# Patient Record
Sex: MALE | Race: WHITE | Employment: FULL TIME | URBAN - METROPOLITAN AREA
[De-identification: names, ages, dates, MRNs, and addresses within clinical notes are randomized per-mention and may not be internally consistent; named-entity substitution may affect disease eponyms.]

---

## 2017-11-20 ENCOUNTER — OFFICE VISIT (OUTPATIENT)
Dept: FAMILY MEDICINE CLINIC | Facility: CLINIC | Age: 32
End: 2017-11-20

## 2017-11-20 DIAGNOSIS — Z23 NEED FOR TD VACCINE: Primary | ICD-10-CM

## 2017-11-20 PROCEDURE — 90714 TD VACC NO PRESV 7 YRS+ IM: CPT | Performed by: NURSE PRACTITIONER

## 2017-11-20 PROCEDURE — 90471 IMMUNIZATION ADMIN: CPT | Performed by: NURSE PRACTITIONER

## 2017-11-20 NOTE — PROGRESS NOTES
Vaccination Screening Questionnaire filled out by patient/parent and reviewed by myself. No contraindications to vaccination. Vaccination information sheet given to patient/parent. Side effects and risks of vaccination explained and discussed.   Patient/

## 2019-01-15 ENCOUNTER — APPOINTMENT (OUTPATIENT)
Dept: CT IMAGING | Facility: HOSPITAL | Age: 34
End: 2019-01-15
Attending: EMERGENCY MEDICINE

## 2019-01-15 PROCEDURE — 70450 CT HEAD/BRAIN W/O DYE: CPT | Performed by: EMERGENCY MEDICINE

## 2019-01-15 NOTE — ED INITIAL ASSESSMENT (HPI)
Patient here with mom and sister. Mother reports the patient was drinking for the past few days, was violent yesterday, she called the police, patient refused to come to the ER.   Mother stayed at her daughters house last night, when she came home this aft

## 2019-01-15 NOTE — ED PROVIDER NOTES
Patient Seen in: [facility] Emergency Department    History   Patient presents with:  Head Neck Injury (neurologic, musculoskeletal)  Alcohol Intoxication (neurologic)    Stated Complaint: head injury, unkown to patient.     HPI    78-year-old male w Resp 21   Ht 185.4 cm (6' 1\")   Wt 67.6 kg   SpO2 99%   BMI 19.66 kg/m²         Physical Exam    HEENT : NCAT, EOMI, PEERL, throat clear, neck supple, no JVD, trachea midline, No LAD  Heart: S1S2 normal. No murmurs, regular rate and rhythm  Lungs: Clear techniques were used. Dose information is transmitted to the A[facility] [employer] of Radiology) NRDR ([address]) which includes the Dose Index Registry.   PATIENT STATED HISTORY: (As transcribed by Technologist)  Patient presents wit

## 2019-01-15 NOTE — ED INITIAL ASSESSMENT (HPI)
Patient drinking Vodka since Sunday, family states things were broken in the house, disheveled. Patient was home alone last night, family was concerned patient hit his head.  Patient states he drinks about a pint of Vodka a day, has been drinking on and off

## 2019-01-16 NOTE — BH LEVEL OF CARE ASSESSMENT
Level of Care Assessment Note    General Questions  Why are you here?: \"I have a drinking problem. I fell and hit my head and I don't remember how I did it. \"  Precipitating Events: Pt reports he started drinking Sunday night which led up to him falling l you ever done anything, started to do anything, or prepared to do anything to end your life? (lifetime): No  Score -  OV: 0- Low Risk   Describe : Pt states, \"I say stuff when I'm drunk but I don't think I really mean it. \"   Is your experience of thoug Description: Pt reports above depressive symptoms began a few years ago and are exacerbated with alcohol use. Anxiety Symptoms: Generalized; Excessive sweating;Shaking  Panic Attacks: Pt reports he may have had panic attacks when intoxicated in past. No re reports it was helpful.          Current/Previous MH/CD Treatment  Recovery Support Groups: Denies Past History(Pt reports he has been thinking about going to AA.)  History of Seclusion/Restraint: No    Alcohol Use  How often do you have a drink containing Behaviors  Are you/others concerned about any of the following behaviors over the past 30 days?: Denies                                              Functional Impairment  Currently Attending School: No  Employment Status: Employed(pt reports he works at [employer] functioning. Pt reports he will drink and not remember his actions, such as drinking and having verbal argument last night with mother or falling while intoxicated.   Judgment: Poor  Fair/poor judgment as evidenced by: Pt continuing to drink almost daily de Erick.    Motivational Stage of Change  Motivational Stage of Change: Contemplative    Level of Care Recommendations  Consulted with: Dr. Rosy Llanos who recommended inpatient detox.   Level of Care Recommendation: Inpatient Acute Care  Unit: CDU  Reason for Uni

## 2019-01-16 NOTE — ED INITIAL ASSESSMENT (HPI)
Pt here for alcohol intoxication. Pt lives with mom and mom was concerned with his well being because he is having difficulty ambulating with a steady gait. Pt states he is already seeking treatment for his drinking problem and denies SI/HI at this time.

## 2019-01-16 NOTE — ED PROVIDER NOTES
Patient Seen in: [facility] Emergency Department    History   Patient presents with:  Alcohol Intoxication (neurologic)    Stated Complaint: ETOH intoxication     HPI    Constantino Gutierrez is a pleasant 47-year-old gentleman coming back to the emergency departme normal limits   DRUG SCREEN 7 W/OUT CONFIRMATION, URINE - Abnormal; Notable for the following components:    Cannabinoid Urine Presumed Positive (*)     Ethyl Alcohol Urine, Qualitative Positive (*)     All other components within normal limits    [place]

## 2019-01-17 NOTE — ED NOTES
Pt removing blood pressure and asking to go home. Pt attempting to climb out of bed. Security called and at bedside. Pt redirectable.

## 2019-01-17 NOTE — ED NOTES
Report received from [facility] for continuation of care in ED.

## 2019-01-17 NOTE — ED NOTES
Remaining 2.5 mg of Haldol given to Pt per Dr. Dianna Chen. Pt throwing urinal across room attempting to get out of bed.

## 2019-01-17 NOTE — ED NOTES
Pt's Mom here in the ED to come and pick him up. Pt denies SI/HI. Pt was offered RYAN eval regarding ETOH use or if he wants detox, Pt declined. Sealed SSPB [id number] was handed off to Pt. Pt noted ambulatory with steady gait.

## 2019-01-17 NOTE — ED NOTES
[facility] Patient care assumed at this time.

## 2019-01-17 NOTE — ED NOTES
Received report, pt in 4 point restraints thrashing around cart, trying to sit up and pull out of restraints.  Attempted to redirect patient, edmd notified

## 2019-01-17 NOTE — ED PROVIDER NOTES
Patient is a 26-year-old male who had presented to the emergency department for alcohol intoxication was initially evaluated by my partner, please refer to his documentation for additional details.     Patient was signed over to me at change of shift given

## 2019-01-17 NOTE — ED NOTES
Pt continues to thrash around cart, screaming. Pt attempting to pull out of restraints.  Unable to redirect. edmd notified

## 2019-01-17 NOTE — ED NOTES
Pt resting comfortably on cart, appears asleep. Pt no longer thrashing around cart or screaming.  Security called to remove restraints at this time

## 2019-01-17 NOTE — ED NOTES
Pt now awake, asking if he can go home. Pt A/A/O x 3. Pt was able to give his Mom's phone number ([phone]). Contacted Pt's Mom - Art Jacobs at [phone] and she said she's coming to come and pick him up.  Pt will be discharged upon his Mom's arrival. LYNNE aw

## 2020-05-12 NOTE — ED NOTES
Patient is a 37year old male who came into the ED today due to having nausea and vomiting  And dirrrhea and abd pain. Patietnt states he has been drinking all weekend about 1.5 pints per day or more. He has a hx of abusing alcohol.  He has been to detox be Awake/Alert

## 2020-05-12 NOTE — ED NOTES
Pt mom left bs to go home because of the risk of her age and a recent hip replacement stating she lives 10min away and we can call her once pt is seen by [facility]. Pt is calm and cooperative lights dimmed. Agrees his mom should go home at this time.

## 2020-05-12 NOTE — ED NOTES
Patient is resting comfortably. Denies pain denies nvd. Pt has outpatient referrals for etoh. Pt is awaiting ride home from mom.

## 2020-05-12 NOTE — ED PROVIDER NOTES
Patient Seen in: [facility] Emergency Department      History   Patient presents with:  Abdomen/Flank Pain  Nausea/Vomiting/Diarrhea    Stated Complaint: abd pain/nausea, \"drinking\" alot recently, \"real hungover\" yesterday    HPI  Patient is a 3 Constitutional:       General: He is not in acute distress. Appearance: He is well-developed. He is not toxic-appearing or diaphoretic. Comments:  Thin appearance   Eyes:      Conjunctiva/sclera: Conjunctivae normal.   Neck:      Musculoskeletal: PLATELET    Narrative: The following orders were created for panel order CBC WITH DIFFERENTIAL WITH PLATELET.   Procedure                               Abnormality         Status                     ---------                               ----------- visit            Medications Prescribed:  Current Discharge Medication List

## 2020-05-12 NOTE — ED INITIAL ASSESSMENT (HPI)
Pt here stating he drank a lot of vodka over the weekend and was not able to go to work yesterday. Pt typically drinks a pint - a pint and a half every couple days. Pt stating he went to work today and developed LLQ pain today. +NVD.  Last ETOH drink was Mendez

## 2020-05-12 NOTE — ED PROVIDER NOTES
The patient's case was discussed with JEREMIAS Macias. I interviewed and examined the patient. Abdominal pain today was in the left lower quadrant associated with a couple of episodes of vomiting but no diarrhea.   The patient does drink on a consistent

## 2024-03-11 ENCOUNTER — APPOINTMENT (OUTPATIENT)
Dept: GENERAL RADIOLOGY | Facility: HOSPITAL | Age: 39
End: 2024-03-11
Attending: EMERGENCY MEDICINE
Payer: MEDICAID

## 2024-03-11 ENCOUNTER — APPOINTMENT (OUTPATIENT)
Dept: CT IMAGING | Facility: HOSPITAL | Age: 39
End: 2024-03-11
Attending: EMERGENCY MEDICINE
Payer: MEDICAID

## 2024-03-11 ENCOUNTER — HOSPITAL ENCOUNTER (INPATIENT)
Facility: HOSPITAL | Age: 39
LOS: 8 days | Discharge: HOME | End: 2024-03-19
Attending: EMERGENCY MEDICINE | Admitting: HOSPITALIST
Payer: MEDICAID

## 2024-03-11 DIAGNOSIS — D69.6 THROMBOCYTOPENIA (HCC): ICD-10-CM

## 2024-03-11 DIAGNOSIS — E87.20 METABOLIC ACIDOSIS: ICD-10-CM

## 2024-03-11 DIAGNOSIS — S00.33XA CONTUSION OF NOSE, INITIAL ENCOUNTER: ICD-10-CM

## 2024-03-11 DIAGNOSIS — R56.9 ALCOHOL WITHDRAWAL SEIZURE WITHOUT COMPLICATION (HCC): Primary | ICD-10-CM

## 2024-03-11 DIAGNOSIS — S01.01XA SCALP LACERATION, INITIAL ENCOUNTER: ICD-10-CM

## 2024-03-11 DIAGNOSIS — F10.930 ALCOHOL WITHDRAWAL SEIZURE WITHOUT COMPLICATION (HCC): Primary | ICD-10-CM

## 2024-03-11 DIAGNOSIS — S09.90XA CLOSED HEAD INJURY, INITIAL ENCOUNTER: ICD-10-CM

## 2024-03-11 PROBLEM — T14.90XA TRAUMA: Status: ACTIVE | Noted: 2024-03-11

## 2024-03-11 LAB
ALBUMIN SERPL-MCNC: 4.6 G/DL (ref 3.4–5)
ALBUMIN/GLOB SERPL: 1.4 {RATIO} (ref 1–2)
ALP LIVER SERPL-CCNC: 72 U/L
ALT SERPL-CCNC: 41 U/L
AMMONIA PLAS-MCNC: 36 UMOL/L (ref 11–32)
AMPHET UR QL SCN: NEGATIVE
ANION GAP SERPL CALC-SCNC: 27 MMOL/L (ref 0–18)
AST SERPL-CCNC: 107 U/L (ref 15–37)
BASOPHILS # BLD AUTO: 0.12 X10(3) UL (ref 0–0.2)
BASOPHILS NFR BLD AUTO: 1.8 %
BENZODIAZ UR QL SCN: NEGATIVE
BILIRUB SERPL-MCNC: 1.1 MG/DL (ref 0.1–2)
BILIRUB UR QL STRIP.AUTO: NEGATIVE
BUN BLD-MCNC: 5 MG/DL (ref 9–23)
CALCIUM BLD-MCNC: 8.7 MG/DL (ref 8.5–10.1)
CHLORIDE SERPL-SCNC: 100 MMOL/L (ref 98–112)
CLARITY UR REFRACT.AUTO: CLEAR
CO2 SERPL-SCNC: 14 MMOL/L (ref 21–32)
COCAINE UR QL: NEGATIVE
CREAT BLD-MCNC: 1.06 MG/DL
CREAT UR-SCNC: 41.3 MG/DL
EGFRCR SERPLBLD CKD-EPI 2021: 91 ML/MIN/1.73M2 (ref 60–?)
EOSINOPHIL # BLD AUTO: 0 X10(3) UL (ref 0–0.7)
EOSINOPHIL NFR BLD AUTO: 0 %
ERYTHROCYTE [DISTWIDTH] IN BLOOD BY AUTOMATED COUNT: 13 %
ETHANOL SERPL-MCNC: 151 MG/DL (ref ?–3)
GLOBULIN PLAS-MCNC: 3.3 G/DL (ref 2.8–4.4)
GLUCOSE BLD-MCNC: 128 MG/DL (ref 70–99)
GLUCOSE UR STRIP.AUTO-MCNC: NORMAL MG/DL
HCT VFR BLD AUTO: 38.5 %
HGB BLD-MCNC: 13.2 G/DL
HYALINE CASTS #/AREA URNS AUTO: PRESENT /LPF
IMM GRANULOCYTES # BLD AUTO: 0.02 X10(3) UL (ref 0–1)
IMM GRANULOCYTES NFR BLD: 0.3 %
KETONES UR STRIP.AUTO-MCNC: 80 MG/DL
LEUKOCYTE ESTERASE UR QL STRIP.AUTO: NEGATIVE
LYMPHOCYTES # BLD AUTO: 1.31 X10(3) UL (ref 1–4)
LYMPHOCYTES NFR BLD AUTO: 19.6 %
MCH RBC QN AUTO: 33.6 PG (ref 26–34)
MCHC RBC AUTO-ENTMCNC: 34.3 G/DL (ref 31–37)
MCV RBC AUTO: 98 FL
MDMA UR QL SCN: NEGATIVE
MONOCYTES # BLD AUTO: 0.6 X10(3) UL (ref 0.1–1)
MONOCYTES NFR BLD AUTO: 9 %
NEUTROPHILS # BLD AUTO: 4.62 X10 (3) UL (ref 1.5–7.7)
NEUTROPHILS # BLD AUTO: 4.62 X10(3) UL (ref 1.5–7.7)
NEUTROPHILS NFR BLD AUTO: 69.3 %
NITRITE UR QL STRIP.AUTO: NEGATIVE
OPIATES UR QL SCN: NEGATIVE
OSMOLALITY SERPL CALC.SUM OF ELEC: 291 MOSM/KG (ref 275–295)
OXYCODONE UR QL SCN: NEGATIVE
PH UR STRIP.AUTO: 5.5 [PH] (ref 5–8)
PLATELET # BLD AUTO: 96 10(3)UL (ref 150–450)
POTASSIUM SERPL-SCNC: 3.7 MMOL/L (ref 3.5–5.1)
PROT SERPL-MCNC: 7.9 G/DL (ref 6.4–8.2)
PROT UR STRIP.AUTO-MCNC: 100 MG/DL
RBC # BLD AUTO: 3.93 X10(6)UL
SODIUM SERPL-SCNC: 141 MMOL/L (ref 136–145)
SP GR UR STRIP.AUTO: >1.03 (ref 1–1.03)
UROBILINOGEN UR STRIP.AUTO-MCNC: NORMAL MG/DL
WBC # BLD AUTO: 6.7 X10(3) UL (ref 4–11)

## 2024-03-11 PROCEDURE — 70450 CT HEAD/BRAIN W/O DYE: CPT | Performed by: EMERGENCY MEDICINE

## 2024-03-11 PROCEDURE — 99223 1ST HOSP IP/OBS HIGH 75: CPT | Performed by: HOSPITALIST

## 2024-03-11 PROCEDURE — HZ2ZZZZ DETOXIFICATION SERVICES FOR SUBSTANCE ABUSE TREATMENT: ICD-10-PCS | Performed by: HOSPITALIST

## 2024-03-11 PROCEDURE — 71045 X-RAY EXAM CHEST 1 VIEW: CPT | Performed by: EMERGENCY MEDICINE

## 2024-03-11 PROCEDURE — 74177 CT ABD & PELVIS W/CONTRAST: CPT | Performed by: EMERGENCY MEDICINE

## 2024-03-11 PROCEDURE — 72125 CT NECK SPINE W/O DYE: CPT | Performed by: EMERGENCY MEDICINE

## 2024-03-11 PROCEDURE — 99253 IP/OBS CNSLTJ NEW/EST LOW 45: CPT | Performed by: STUDENT IN AN ORGANIZED HEALTH CARE EDUCATION/TRAINING PROGRAM

## 2024-03-11 RX ORDER — SODIUM CHLORIDE 9 MG/ML
INJECTION, SOLUTION INTRAVENOUS CONTINUOUS
Status: DISCONTINUED | OUTPATIENT
Start: 2024-03-11 | End: 2024-03-13

## 2024-03-11 RX ORDER — LORAZEPAM 2 MG/1
2 TABLET ORAL
Status: DISCONTINUED | OUTPATIENT
Start: 2024-03-11 | End: 2024-03-13

## 2024-03-11 RX ORDER — THIAMINE HYDROCHLORIDE 100 MG/ML
100 INJECTION, SOLUTION INTRAMUSCULAR; INTRAVENOUS DAILY
Status: DISCONTINUED | OUTPATIENT
Start: 2024-03-12 | End: 2024-03-13

## 2024-03-11 RX ORDER — LEVETIRACETAM 500 MG/5ML
1500 INJECTION, SOLUTION, CONCENTRATE INTRAVENOUS ONCE
Status: COMPLETED | OUTPATIENT
Start: 2024-03-11 | End: 2024-03-11

## 2024-03-11 RX ORDER — SODIUM CHLORIDE 9 MG/ML
INJECTION, SOLUTION INTRAVENOUS CONTINUOUS
Status: ACTIVE | OUTPATIENT
Start: 2024-03-11 | End: 2024-03-11

## 2024-03-11 RX ORDER — ONDANSETRON 2 MG/ML
4 INJECTION INTRAMUSCULAR; INTRAVENOUS ONCE
Status: COMPLETED | OUTPATIENT
Start: 2024-03-11 | End: 2024-03-11

## 2024-03-11 RX ORDER — ONDANSETRON 2 MG/ML
4 INJECTION INTRAMUSCULAR; INTRAVENOUS EVERY 6 HOURS PRN
Status: DISCONTINUED | OUTPATIENT
Start: 2024-03-11 | End: 2024-03-19

## 2024-03-11 RX ORDER — IBUPROFEN 400 MG/1
400 TABLET ORAL EVERY 4 HOURS PRN
Status: DISCONTINUED | OUTPATIENT
Start: 2024-03-11 | End: 2024-03-17

## 2024-03-11 RX ORDER — IOHEXOL 350 MG/ML
100 INJECTION, SOLUTION INTRAVENOUS
Status: COMPLETED | OUTPATIENT
Start: 2024-03-11 | End: 2024-03-11

## 2024-03-11 RX ORDER — THIAMINE HYDROCHLORIDE 100 MG/ML
100 INJECTION, SOLUTION INTRAMUSCULAR; INTRAVENOUS ONCE
Status: COMPLETED | OUTPATIENT
Start: 2024-03-11 | End: 2024-03-11

## 2024-03-11 RX ORDER — PROCHLORPERAZINE EDISYLATE 5 MG/ML
5 INJECTION INTRAMUSCULAR; INTRAVENOUS EVERY 8 HOURS PRN
Status: DISCONTINUED | OUTPATIENT
Start: 2024-03-11 | End: 2024-03-19

## 2024-03-11 RX ORDER — LORAZEPAM 2 MG/ML
INJECTION INTRAMUSCULAR
Status: DISCONTINUED
Start: 2024-03-11 | End: 2024-03-11 | Stop reason: WASHOUT

## 2024-03-11 RX ORDER — ONDANSETRON 2 MG/ML
INJECTION INTRAMUSCULAR; INTRAVENOUS
Status: COMPLETED
Start: 2024-03-11 | End: 2024-03-11

## 2024-03-11 RX ORDER — LORAZEPAM 1 MG/1
1 TABLET ORAL
Status: DISCONTINUED | OUTPATIENT
Start: 2024-03-11 | End: 2024-03-13

## 2024-03-11 RX ORDER — DOXEPIN HYDROCHLORIDE 50 MG/1
1 CAPSULE ORAL DAILY
Status: DISCONTINUED | OUTPATIENT
Start: 2024-03-11 | End: 2024-03-15

## 2024-03-11 RX ORDER — LORAZEPAM 2 MG/ML
2 INJECTION INTRAMUSCULAR ONCE
Status: COMPLETED | OUTPATIENT
Start: 2024-03-11 | End: 2024-03-11

## 2024-03-11 RX ORDER — LORAZEPAM 2 MG/ML
2 INJECTION INTRAMUSCULAR
Status: DISCONTINUED | OUTPATIENT
Start: 2024-03-11 | End: 2024-03-13

## 2024-03-11 RX ORDER — IBUPROFEN 200 MG
200 TABLET ORAL EVERY 4 HOURS PRN
Status: DISCONTINUED | OUTPATIENT
Start: 2024-03-11 | End: 2024-03-17

## 2024-03-11 RX ORDER — IBUPROFEN 600 MG/1
600 TABLET ORAL EVERY 4 HOURS PRN
Status: DISCONTINUED | OUTPATIENT
Start: 2024-03-11 | End: 2024-03-17

## 2024-03-11 RX ORDER — ACETAMINOPHEN 500 MG
500 TABLET ORAL EVERY 4 HOURS PRN
Status: DISCONTINUED | OUTPATIENT
Start: 2024-03-11 | End: 2024-03-19

## 2024-03-11 RX ORDER — LORAZEPAM 2 MG/ML
1 INJECTION INTRAMUSCULAR
Status: DISCONTINUED | OUTPATIENT
Start: 2024-03-11 | End: 2024-03-13

## 2024-03-11 NOTE — ED INITIAL ASSESSMENT (HPI)
Patient presents for evaluation of fall, altered mental status. EMS reports mom heard two loud falls. EMS found patient in tub with large hematoma to right side of head, laceration to nose. EMS reports he was starting to be able to answer questions and started seizing on arrival to ER. Seizure lasted approximately 15 seconds. He reports only consuming marijuana today.

## 2024-03-11 NOTE — ED QUICK NOTES
..Orders for admission, patient is aware of plan and ready to go upstairs. Any questions, please call ED RN Precious  at extension [phone].     Vaccinated?  Type of COVID test sent:  COVID Suspicion level: Low      Titratable drug(s) infusing:  Rate: n/a    LOC at time of transport:alert and oriented X2 name and place, unsure of date.     Other pertinent information:     CIWA score=4   NIH score= 0

## 2024-03-11 NOTE — PLAN OF CARE
Admission navigator completed.  [facility] protocol.  Bleeding noted to back of head.  Plan for EEG and neuro consulted. Resting comfortably in bed.  Family at bedside.

## 2024-03-11 NOTE — H&P
[facility]     Philip Castellano Patient Status:  Emergency    1985 MRN [id number]   Location [facility] EMERGENCY DEPARTMENT Attending Corrina Hughes MD   Hosp Day # 0 PCP Tish Guzman     Chief Complaint: seizures    Subjective:    History of Present Illness:     Philip Castellano is a 38 year old male with medical history of alcohol abuse who comes to the ER for evaluation after a fall at home.  His mother called paramedics as she heard 2 loud falls.  He was found in the tub by EMS with a large hematoma on the right side of his head and a laceration to his nose.  He was brought to the ER and on arrival to the ER he started having a seizure which lasted approximately 15 seconds.  He states that his last drink was 2 nights ago.  He does report that he does have a drinking problem and binge drinks frequently.  Today he reports he did have some marijuana.  Currently he is awake alert.  He reports he bit his tongue and is noted to have trauma to his nose and blood around his lips.    History/Other:    Past Medical History:  Past Medical History:   Diagnosis Date    Alcoholism (HCC)     Clavicle fracture      Past Surgical History:   No past surgical history on file.   Family History:   Family History   Problem Relation Age of Onset    Hypertension Mother     Hypertension Father      Social History:    reports that he has quit smoking. He uses smokeless tobacco. He reports current alcohol use. He reports that he does not use drugs.     Allergies: No Known Allergies    Medications:    No current facility-administered medications on file prior to encounter.     Current Outpatient Medications on File Prior to Encounter   Medication Sig Dispense Refill    Sertraline HCl (ZOLOFT) 50 MG Oral Tab Take 1 tablet (50 mg total) by mouth daily. 60 tablet 2       Review of Systems:   A comprehensive review of systems was completed.    Pertinent positives and negatives noted in the  HPI.    Objective:   Physical Exam:    /86   Pulse (!) 140   Resp 18   SpO2 98%   General: No acute distress, Alert  Respiratory: No rhonchi, no wheezes  Cardiovascular: S1, S2. Regular rate and rhythm  Abdomen: Soft, Non-tender, non-distended, positive bowel sounds  Neuro: No new focal deficits  Extremities: No edema      Results:    Labs:      Labs Last 24 Hours:    Recent Labs   Lab 03/11/24  1443   RBC 3.93*   HGB 13.2   HCT 38.5*   MCV 98.0   MCH 33.6   MCHC 34.3   RDW 13.0   NEPRELIM 4.62   WBC 6.7   PLT 96.0*       Recent Labs   Lab 03/11/24  1443   *   BUN 5*   CREATSERUM 1.06   EGFRCR 91   CA 8.7   ALB 4.6      K 3.7      CO2 14.0*   ALKPHO 72   *   ALT 41   BILT 1.1   TP 7.9       No results found for: \"PT\", \"INR\"    No results for input(s): \"TROP\", \"TROPHS\", \"CK\" in the last 168 hours.    No results for input(s): \"TROP\", \"PBNP\" in the last 168 hours.    No results for input(s): \"PCT\" in the last 168 hours.    Imaging: Imaging data reviewed in Epic.    Assessment & Plan:      #Seizures  -suspect ETOH withdrawal seizures  -given keppra in ER  -EEG  -CT brain negative     # Alcohol withdrawls  -WA protocol    # Elevated AST and TCP  -due to ETOH abuse  -trend    # Facial / nose laceration and Partial hematoma    Plan of care discussed with patient and ER physician    Sera Harris MD    Supplementary Documentation:     The 21st Century Cures Act makes medical notes like these available to patients in the interest of transparency. Please be advised this is a medical document. Medical documents are intended to carry relevant information, facts as evident, and the clinical opinion of the practitioner. The medical note is intended as peer to peer communication and may appear blunt or direct. It is written in medical language and may contain abbreviations or verbiage that are unfamiliar.

## 2024-03-11 NOTE — CONSULTS
[facility]  Report of Consultation    Philip Castellano Patient Status:  Inpatient    1985 MRN [id number]   Location [facility] 3NE-A Attending Sera Harris MD   Hosp Day # 0 PCP Tish Guzman     Requesting Physician:  Dr. Hughes    Reason for Consultation:  Trauma II    Chief Complaint:  Found unresponsive, head injury, EtOH    History of Present Illness:  Philip Castellano is a 38 year old male with past medical history of heavy alcohol abuse, presenting to the ER by EMS after being found unresponsive at home.  History is obtained mostly through ED physician, as patient has recently received Ativan and sedated.  Reportedly, patient's mother heard 2 loud falls upstairs, and ultimately found her son in the bathroom with a large hematoma to the right side of his head and minimally responsive.  Upon presentation to the ED, patient had a generalized seizure, presumed secondary to alcohol withdrawal.  He was given Ativan and Keppra.  Patient unable to answer further questions at this time, though per hospitalist note, last drink was 2 nights ago.  Patient has a history of binge drinking.  No history of alcohol withdrawal seizures.    In ED, he was noted to have a large hematoma to the right side of his head, laceration to his nose, bit his tongue.  He was called as a level 2 trauma.  EtOH 151.  CBC without leukocytosis, hemoglobin 13.2, platelets 96.  CMP notable for CO2 14, , remainder of liver enzymes within normal limits.  Urinalysis negative for RBC or infection.  Urine drug screen positive for cannabis.  CT brain negative for acute intracranial process.  Scalp laceration and hematoma noted without calvarial or fracture.  CT C-spine negative for acute traumatic injury.  He was noted to have degenerative changes at C5-6.  CT abdomen pelvis without acute intra-abdominal injuries.  Chest x-ray without pneumothorax or rib fractures.  No additional injuries were noted by ED physician or  patient.  He is not on anticoagulation.    Past medical history EtOH abuse    Past abdominal surgical history: Denies    Denies pertinent family history    History:  Past Medical History:   Diagnosis Date    Alcoholism (HCC)     Clavicle fracture      No past surgical history on file.  Family History   Problem Relation Age of Onset    Hypertension Mother     Hypertension Father       reports that he has quit smoking. He uses smokeless tobacco. He reports current alcohol use. He reports that he does not use drugs.    Allergies:  No Known Allergies    Medications:  No medications prior to admission.         Current Facility-Administered Medications:     sodium chloride 0.9% infusion, , Intravenous, Continuous    sertraline (Zoloft) tab 50 mg, 50 mg, Oral, Daily    thiamine 100 mg/mL injection 100 mg, 100 mg, Intravenous, Once    [START ON 3/12/2024] thiamine 100 mg/mL injection 100 mg, 100 mg, Intravenous, Daily    folic acid (Folvite) 1 mg in sodium chloride 0.9% 50 mL IVPB, 1 mg, Intravenous, Daily    multivitamin (Tab-A-Tara/Beta Carotene) tab 1 tablet, 1 tablet, Oral, Daily    sodium chloride 0.9% infusion, , Intravenous, Continuous    acetaminophen (Tylenol Extra Strength) tab 500 mg, 500 mg, Oral, Q4H PRN    ibuprofen (Motrin) tab 200 mg, 200 mg, Oral, Q4H PRN **OR** ibuprofen (Motrin) tab 400 mg, 400 mg, Oral, Q4H PRN **OR** ibuprofen (Motrin) tab 600 mg, 600 mg, Oral, Q4H PRN    LORazepam (Ativan) tab 1 mg, 1 mg, Oral, Q1H PRN **OR** LORazepam (Ativan) 2 mg/mL injection 1 mg, 1 mg, Intravenous, Q1H PRN **OR** LORazepam (Ativan) tab 2 mg, 2 mg, Oral, Q1H PRN **OR** LORazepam (Ativan) 2 mg/mL injection 2 mg, 2 mg, Intravenous, Q1H PRN    ondansetron (Zofran) 4 MG/2ML injection 4 mg, 4 mg, Intravenous, Q6H PRN    prochlorperazine (Compazine) 10 MG/2ML injection 5 mg, 5 mg, Intravenous, Q8H PRN    Review of Systems:  Review of Systems   Unable to perform ROS      Physical Exam:  BP (!) 140/103   Pulse 108   Temp  99.2 °F (37.3 °C) (Temporal)   Resp 22   SpO2 98%   Physical Exam  Constitutional:       General: He is not in acute distress.     Appearance: He is not ill-appearing or toxic-appearing. Diaphoretic: large hematoma right scalp.  HENT:      Head: Normocephalic.      Nose:      Comments: Superficial laceration to bridge of nose     Mouth/Throat:      Comments: Tongue laceration, dried blood around mouth  Eyes:      Conjunctiva/sclera: Conjunctivae normal.      Pupils: Pupils are equal, round, and reactive to light.   Cardiovascular:      Rate and Rhythm: Normal rate and regular rhythm.   Pulmonary:      Effort: Pulmonary effort is normal. No respiratory distress.   Abdominal:      General: There is no distension.      Palpations: Abdomen is soft.      Tenderness: There is no guarding.      Hernia: No hernia is present.   Musculoskeletal:         General: No swelling. Normal range of motion.      Cervical back: Neck supple.   Skin:     General: Skin is warm and dry.      Coloration: Skin is not jaundiced.   Neurological:      General: No focal deficit present.   Psychiatric:      Comments: sedated         Laboratory Data:  Recent Labs   Lab 03/11/24  1443   RBC 3.93*   HGB 13.2   HCT 38.5*   MCV 98.0   MCH 33.6   MCHC 34.3   RDW 13.0   NEPRELIM 4.62   WBC 6.7   PLT 96.0*       Recent Labs   Lab 03/11/24  1443   *   BUN 5*   CREATSERUM 1.06   CA 8.7   ALB 4.6      K 3.7      CO2 14.0*   ALKPHO 72   *   ALT 41   BILT 1.1   TP 7.9         No results for input(s): \"PTP\", \"INR\", \"PTT\" in the last 168 hours.      XR CHEST AP PORTABLE  (CPT=71045)    Result Date: 3/11/2024  PROCEDURE:  XR CHEST AP PORTABLE  (CPT=71045)  TECHNIQUE:  AP chest radiograph was obtained.  COMPARISON:  None.  INDICATIONS:  mom heard thud, heavy ETOH use. Left hematoma, neck lac. Found in tub 238 level 2 trauma called. was coming around for EMS, seizing on arrival.  PATIENT STATED HISTORY: (As transcribed by Technologist)   Pt.   mom heard thud, heavy ETOH use. Left hematoma, neck lac. Found in tub 238 level 2 trauma called. was coming around for EMS, seizing on arrival.   FINDINGS: Cardiac silhouette and pulmonary vasculature are unremarkable. No consolidation, pleural effusion or pneumothorax.  Prominence of the right hilum is noted. IMPRESSION: No consolidation.  Prominence of the right hilum is noted.  A CT chest examination with contrast evaluate this region to exclude a mass is recommended.   LOCATION:  [facility]      Dictated by (CST): Agustin Rapp MD on 3/11/2024 at 5:53 PM     Finalized by (CST): Agustin Rapp MD on 3/11/2024 at 5:54 PM       CT ABDOMEN+PELVIS(CONTRAST ONLY)(CPT=74177)    Result Date: 3/11/2024  CONCLUSION:  1. Nonspecific fat stranding surrounding the ascending colon and cecum.  Findings may be related to nonspecific colitis. 2. Severe diffuse hepatic steatosis.   LOCATION:  [id number]   Dictated by (CST): Sadiq Gordon MD on 3/11/2024 at 5:08 PM     Finalized by (CST): Sadiq Gordon MD on 3/11/2024 at 5:11 PM       CT BRAIN OR HEAD (00164)    Result Date: 3/11/2024  CONCLUSION:   1. Negative for acute intracranial process.  2. Right frontal parietal scalp laceration and hematoma without calvarial fracture.    LOCATION:  [id number]   Dictated by (CST): Deandre Villalta MD on 3/11/2024 at 3:32 PM     Finalized by (CST): Deandre Villalta MD on 3/11/2024 at 3:34 PM       CT SPINE CERVICAL (CPT=72125)    Result Date: 3/11/2024  CONCLUSION:   1. No evidence of acute traumatic injury of the cervical spine.  2. Disc and endplate degenerative changes at C5-6 resulting in mild central canal stenosis and moderate left-sided foraminal stenosis.     LOCATION:  [id number]   Dictated by (CST): Deandre Villalta MD on 3/11/2024 at 3:08 PM     Finalized by (CST): Deandre Villalta MD on 3/11/2024 at 3:12 PM          Lexis Brink PA-C  3/11/2024  6:23 PM      Medical Decision Making         Impression:  Patient Active Problem List   Diagnosis     Alcohol withdrawal seizure without complication (HCC)    Seizures (HCC)    Closed head injury, initial encounter    Scalp laceration, initial encounter    Contusion of nose, initial encounter    Thrombocytopenia (HCC)    Metabolic acidosis       This is a 38-year-old man who presents as a level 2 trauma, status post fall with EtOH, seizure    No acute trauma surgery intervention  Patient presented as altered, workup was negative for any acute traumatic finding  On physical exam, patient remained altered but was found to have right parietal hematoma, laceration over the bridge of his nose, and a bruise to his right back  Abdomen was negative for any acute finding    Hospitalist to admit  Neurology for seizure  Patient has a significant history of alcohol abuse, CIWA protocol per primary  Trauma surgery will see tomorrow for tertiary survey  Rest of care per primary    Thank you for letting me participate in the care of this patient  Freida Rick MD  [facility]  3/11/2024  7:10 PM

## 2024-03-11 NOTE — ED PROVIDER NOTES
Patient Seen in: [facility] Emergency Department      History     Chief Complaint   Patient presents with    Trauma 1 & 2     Stated Complaint: mom heard thud, heavy ETOH use. Left hematoma, neck lac. Found in tub 238 level*    Subjective:   HPI    38-year-old male with a history of alcohol abuse was brought to the emergency department by paramedics after they were called to the residence.  The patient's mother reportedly heard 2 falls and found the patient in the bathroom with a head wound and very stuporous.  His mentation cleared by the time he came to the emergency department but upon arrival he had a generalized seizure.  Because of the patient's level of confusion he is unable to provide any additional history at this time.    Objective:   No pertinent past medical history.            No pertinent past surgical history.              No pertinent social history.            Review of Systems    Positive for stated complaint: mom heard thud, heavy ETOH use. Left hematoma, neck lac. Found in tub 238 level*  Other systems are as noted in HPI.  Constitutional and vital signs reviewed.      All other systems reviewed and negative except as noted above.    Physical Exam     ED Triage Vitals   BP 03/11/24 1451 (!) 118/104   Pulse 03/11/24 1451 (!) 128   Resp 03/11/24 1451 16   Temp 03/11/24 1530 99.2 °F (37.3 °C)   Temp src 03/11/24 1530 Temporal   SpO2 03/11/24 1451 97 %   O2 Device 03/11/24 1451 None (Room air)       Current:BP (!) 146/97   Pulse 109   Temp 99.2 °F (37.3 °C) (Temporal)   Resp 18   SpO2 97%         Physical Exam    General appearance upon arrival: This is a young adult male who is moving about on a gurney and nonverbal.  Vital signs were reviewed per nursing notes.  Monitor revealed a sinus tachycardia rate of 1 10-1 30.  Pulse oximetry was approximately 95%.  Respirations were unlabored.  HEENT: Normocephalic.  Pupils are equal round reactive to light.  There is a subcentimeter laceration  over the nasal bridge with soft tissue swelling.  There is an abrasion over a right parieto-occipital cephalohematoma which is approximately 5 cm in diameter.  Dried blood in the mouth but no large laceration to discerned.  No malocclusion grossly.  Neck: C-collar had been in place however the patient ripped this off upon arrival.  No adenopathy or thyromegaly.  Lungs are clear to auscultation.  Heart exam: Normal S1-S2 without extra sounds or murmurs.  Regular rate and rhythm.  Chest and abdomen are nontender.  Extremities are atraumatic.  Skin is dry without other rashes or lesions.  Neuroexam: Restless, nonverbal and moving about on the cart consistent with post ictal behavior.    ED Course     Labs Reviewed   COMP METABOLIC PANEL (14) - Abnormal; Notable for the following components:       Result Value    Glucose 128 (*)     CO2 14.0 (*)     Anion Gap 27 (*)     BUN 5 (*)      (*)     All other components within normal limits   ETHYL ALCOHOL - Abnormal; Notable for the following components:    Ethyl Alcohol 151 (*)     All other components within normal limits   CBC W/ DIFFERENTIAL - Abnormal; Notable for the following components:    RBC 3.93 (*)     HCT 38.5 (*)     PLT 96.0 (*)     All other components within normal limits   CBC WITH DIFFERENTIAL WITH PLATELET    Narrative:     The following orders were created for panel order CBC With Differential With Platelet.  Procedure                               Abnormality         Status                     ---------                               -----------         ------                     CBC W/ DIFFERENTIAL[[id number]]          Abnormal            Final result                 Please view results for these tests on the individual orders.   URINALYSIS, ROUTINE   DRUG SCREEN 7 W/OUT CONFIRMATION, URINE   RAINBOW DRAW LAVENDER   RAINBOW DRAW LIGHT GREEN   RAINBOW DRAW GOLD   RAINBOW DRAW BLUE     EKG    Rate, intervals and axes as noted on EKG Report.  Rate:  112  Rhythm: Accelerated junctional rhythm  Reading: Nonspecific ST abnormality.  Prolonged QT.  Abnormal EKG.  Agree with EKG report.                 Intravenous access was obtained.  Laboratory studies were drawn.  The patient was treated with 2 mg of Ativan and 4 mg of Zofran IV push.    Keppra 1500 mg IV piggyback was ordered.    CT ABDOMEN+PELVIS(CONTRAST ONLY)(CPT=74177)    Result Date: 3/11/2024  PROCEDURE:  CT ABDOMEN+PELVIS (CONTRAST ONLY) (CPT=74177)  COMPARISON:  None.  INDICATIONS:  mom heard thud, heavy ETOH use. Left hematoma, neck lac. Found in tub 238 level 2 trauma called. was coming around for EMS, seizing on arrival.  TECHNIQUE:  CT scanning was performed from the dome of the diaphragm to the pubic symphysis with non-ionic intravenous contrast material. Post contrast coronal MPR imaging was performed.  Dose reduction techniques were used. Dose information is transmitted to the ACR (American College of Radiology) NRDR (National Radiology Data Registry) which includes the Dose Index Registry.  PATIENT STATED HISTORY:(As transcribed by Technologist)  Patient is here for ETOH, fall, left sided trauma, seizure.   CONTRAST USED:  100cc of Omnipaque 350  FINDINGS:  LIVER:  Diffuse hepatic steatosis.  No focal lesion.  There is a paddle megaly measuring 21.2 centimeters in craniocaudal dimension. BILIARY:  No visible dilatation or calcification.  PANCREAS:  No lesion, fluid collection, ductal dilatation, or atrophy.  SPLEEN:  No enlargement or focal lesion.  KIDNEYS:  No mass, obstruction, or calcification.  ADRENALS:  No mass or enlargement.  AORTA/VASCULAR:  No aneurysm or dissection.  RETROPERITONEUM:  No mass or adenopathy.  BOWEL/MESENTERY:  No dilated bowel.  There is mild fat stranding surrounding the ascending colon and cecum.  The appendix is normal. ABDOMINAL WALL:  No mass or hernia.  URINARY BLADDER:  No visible focal wall thickening, lesion, or calculus.  PELVIC NODES:  No adenopathy.  PELVIC  ORGANS:  No visible mass.  Pelvic organs appropriate for patient age.  BONES:  No bony lesion or fracture.  LUNG BASES:  No visible pulmonary or pleural disease.  OTHER:  Negative.             CONCLUSION:  1. Nonspecific fat stranding surrounding the ascending colon and cecum.  Findings may be related to nonspecific colitis. 2. Severe diffuse hepatic steatosis.   LOCATION:  [id number]   Dictated by (CST): Sadiq Gordon MD on 3/11/2024 at 5:08 PM     Finalized by (CST): Sadiq Gordon MD on 3/11/2024 at 5:11 PM       CT BRAIN OR HEAD (47125)    Result Date: 3/11/2024  PROCEDURE:  CT BRAIN OR HEAD (22369)  COMPARISON:  [facility] , CT, CT BRAIN OR HEAD (86407), 1/15/2019, 6:31 PM.  INDICATIONS:  mom heard thud, heavy ETOH use. Left hematoma, neck lac. Found in tub 238 level 2 trauma called. was coming around for EMS, seizing on arrival.  TECHNIQUE:  Noncontrast CT scanning is performed through the brain. Dose reduction techniques were used. Dose information is transmitted to the ACR (American College of Radiology) NRDR (National Radiology Data Registry) which includes the Dose Index Registry.  PATIENT STATED HISTORY: (As transcribed by Technologist)  Trauma 1 and 2. Patient fell.  Intoxicated. Left head hematoma, Neck laceration.  Found in tub. Seizure on arrival.    FINDINGS:  VENTRICLES/SULCI:  Ventricles and sulci are normal in size.  INTRACRANIAL:  No intracranial hemorrhage, mass effect, or acute large vessel transcortical infarct.  Gray-white differentiation is preserved. SINUSES:           Paranasal sinuses and mastoid air cells are clear. SKULL:             Right frontoparietal scalp laceration and hematoma without calvarial fracture. OTHER:             None.            CONCLUSION:   1. Negative for acute intracranial process.  2. Right frontal parietal scalp laceration and hematoma without calvarial fracture.    LOCATION:  [id number]   Dictated by (CST): Deandre Villalta MD on 3/11/2024 at 3:32 PM     Finalized by (CST):  Deandre Villalta MD on 3/11/2024 at 3:34 PM       CT SPINE CERVICAL (CPT=72125)    Result Date: 3/11/2024  PROCEDURE:  CT SPINE CERVICAL (CPT=72125)  COMPARISON:  None.  INDICATIONS:  mom heard thud, heavy ETOH use. Left hematoma, neck lac. Found in tub 238 level 2 trauma called. was coming around for EMS, seizing on arrival.  TECHNIQUE:  Noncontrast CT scanning of the cervical spine is performed from the skull base through C7.  Multiplanar reconstructions are generated.  Dose reduction techniques were used. Dose information is transmitted to the ACR (American College of Radiology) NRDR (National Radiology Data Registry) which includes the Dose Index Registry.  PATIENT STATED HISTORY: (As transcribed by Technologist)  Trauma 1 and 2. Patient fell.  Intoxicated. Left head hematoma, Neck laceration.  Found in tub. Seizure on arrival.    FINDINGS:    Negative for acute fracture or traumatic malalignment of the cervical spine.  Mild/moderate disc height loss and endplate degenerative change noted at the C5-6 level.  This contributes to mild central canal stenosis and moderate left-sided foraminal stenosis at this level.  Lateral masses are aligned.  Normal odontoid process.  No prevertebral edema.  No epidural or paraspinal collections.  No soft tissue laceration or regional hematoma identified.  Lung apices are clear.            CONCLUSION:   1. No evidence of acute traumatic injury of the cervical spine.  2. Disc and endplate degenerative changes at C5-6 resulting in mild central canal stenosis and moderate left-sided foraminal stenosis.     LOCATION:  [id number]   Dictated by (CST): Deandre Villalta MD on 3/11/2024 at 3:08 PM     Finalized by (CST): Deandre Villalta MD on 3/11/2024 at 3:12 PM       I personally reviewed the images myself and went over results with patient.    I viewed the CT plane brain and CT cervical spine and they show no fracture or intracranial hemorrhage.    I viewed the CT abdomen and pelvis which shows some  nonspecific fat stranding around the ascending colon but no other intra-abdominal or retroperitoneal pathology.    CT imaging was requested by general surgeon Dr. Rick.  Fast scan was performed by this examiner and showed no evidence of pericardial effusion or intra-abdominal fluid collection.    The patient remained lethargic but arousable and had no additional seizures up until the time of dictation.  Case was discussed with [place] hospitalist Dr. Harris who evaluated the patient in the emergency department.    Tetanus status is up-to-date based on EMR.    Plan is to admit the patient to [facility] for alcohol withdrawal seizures based on a regular typically low blood alcohol level for him of 151 upon arrival.    A total of 40 minutes of critical care time (exclusive of billable procedures) was administered to manage the patient's neurologic instability due to his alcohol withdrawal seizures.  This involved direct patient intervention, complex decision making, and/or extensive discussions with the patient, family, and clinical staff.        MDM      #1.  Alcohol withdrawal seizures.  Altered mentation at home may have been the result of the head injury but also an unwitnessed seizure.  Given intravenous Ativan.  Loaded with Keppra.  2.  Closed head injury.  3.  Scalp laceration.  4.  Nasal contusion.  Notable head trauma without intracranial hemorrhage.  Cervical spine shows no fracture on CT.  5.  Thrombocytopenia.  6.  Metabolic acidosis.  Admission disposition: 3/11/2024  4:03 PM                                        Medical Decision Making      Disposition and Plan     Clinical Impression:  1. Alcohol withdrawal seizure without complication (HCC)    2. Closed head injury, initial encounter    3. Scalp laceration, initial encounter    4. Contusion of nose, initial encounter    5. Thrombocytopenia (HCC)    6. Metabolic acidosis         Disposition:  Admit  3/11/2024  4:03 pm    Follow-up:  No follow-up  provider specified.        Medications Prescribed:  Current Discharge Medication List                            Hospital Problems       Present on Admission  Date Reviewed: 6/27/2018            ICD-10-CM Noted POA    * (Principal) Alcohol withdrawal seizure without complication (HCC) F10.930, R56.9 3/11/2024 Unknown    Seizures (HCC) R56.9 3/11/2024 Unknown

## 2024-03-12 ENCOUNTER — NURSE ONLY (OUTPATIENT)
Dept: ELECTROPHYSIOLOGY | Facility: HOSPITAL | Age: 39
End: 2024-03-12
Attending: HOSPITALIST
Payer: MEDICAID

## 2024-03-12 PROBLEM — F10.931 DELIRIUM TREMENS (HCC): Status: ACTIVE | Noted: 2024-03-12

## 2024-03-12 LAB
ALBUMIN SERPL-MCNC: 3.9 G/DL (ref 3.4–5)
ALBUMIN/GLOB SERPL: 1.4 {RATIO} (ref 1–2)
ALP LIVER SERPL-CCNC: 57 U/L
ALT SERPL-CCNC: 30 U/L
ANION GAP SERPL CALC-SCNC: 9 MMOL/L (ref 0–18)
AST SERPL-CCNC: 69 U/L (ref 15–37)
BASOPHILS # BLD AUTO: 0.05 X10(3) UL (ref 0–0.2)
BASOPHILS NFR BLD AUTO: 0.9 %
BILIRUB SERPL-MCNC: 1.8 MG/DL (ref 0.1–2)
BUN BLD-MCNC: 4 MG/DL (ref 9–23)
C DIFF TOX B STL QL: POSITIVE
CALCIUM BLD-MCNC: 7.7 MG/DL (ref 8.5–10.1)
CHLORIDE SERPL-SCNC: 95 MMOL/L (ref 98–112)
CO2 SERPL-SCNC: 30 MMOL/L (ref 21–32)
CREAT BLD-MCNC: 0.64 MG/DL
EGFRCR SERPLBLD CKD-EPI 2021: 123 ML/MIN/1.73M2 (ref 60–?)
EOSINOPHIL # BLD AUTO: 0.01 X10(3) UL (ref 0–0.7)
EOSINOPHIL NFR BLD AUTO: 0.2 %
ERYTHROCYTE [DISTWIDTH] IN BLOOD BY AUTOMATED COUNT: 13 %
GLOBULIN PLAS-MCNC: 2.8 G/DL (ref 2.8–4.4)
GLUCOSE BLD-MCNC: 74 MG/DL (ref 70–99)
GLUCOSE BLD-MCNC: 91 MG/DL (ref 70–99)
HCT VFR BLD AUTO: 32 %
HGB BLD-MCNC: 11 G/DL
IMM GRANULOCYTES # BLD AUTO: 0.04 X10(3) UL (ref 0–1)
IMM GRANULOCYTES NFR BLD: 0.7 %
LYMPHOCYTES # BLD AUTO: 0.62 X10(3) UL (ref 1–4)
LYMPHOCYTES NFR BLD AUTO: 10.5 %
MCH RBC QN AUTO: 32.8 PG (ref 26–34)
MCHC RBC AUTO-ENTMCNC: 34.4 G/DL (ref 31–37)
MCV RBC AUTO: 95.5 FL
MONOCYTES # BLD AUTO: 0.66 X10(3) UL (ref 0.1–1)
MONOCYTES NFR BLD AUTO: 11.2 %
MRSA DNA SPEC QL NAA+PROBE: NEGATIVE
NEUTROPHILS # BLD AUTO: 4.5 X10 (3) UL (ref 1.5–7.7)
NEUTROPHILS # BLD AUTO: 4.5 X10(3) UL (ref 1.5–7.7)
NEUTROPHILS NFR BLD AUTO: 76.5 %
OSMOLALITY SERPL CALC.SUM OF ELEC: 274 MOSM/KG (ref 275–295)
PLATELET # BLD AUTO: 60 10(3)UL (ref 150–450)
POTASSIUM SERPL-SCNC: 2.5 MMOL/L (ref 3.5–5.1)
POTASSIUM SERPL-SCNC: 2.9 MMOL/L (ref 3.5–5.1)
PROT SERPL-MCNC: 6.7 G/DL (ref 6.4–8.2)
RBC # BLD AUTO: 3.35 X10(6)UL
SODIUM SERPL-SCNC: 134 MMOL/L (ref 136–145)
WBC # BLD AUTO: 5.9 X10(3) UL (ref 4–11)

## 2024-03-12 PROCEDURE — 99291 CRITICAL CARE FIRST HOUR: CPT | Performed by: STUDENT IN AN ORGANIZED HEALTH CARE EDUCATION/TRAINING PROGRAM

## 2024-03-12 PROCEDURE — 95816 EEG AWAKE AND DROWSY: CPT | Performed by: OTHER

## 2024-03-12 PROCEDURE — 99291 CRITICAL CARE FIRST HOUR: CPT

## 2024-03-12 PROCEDURE — 99232 SBSQ HOSP IP/OBS MODERATE 35: CPT | Performed by: HOSPITALIST

## 2024-03-12 PROCEDURE — 99232 SBSQ HOSP IP/OBS MODERATE 35: CPT | Performed by: STUDENT IN AN ORGANIZED HEALTH CARE EDUCATION/TRAINING PROGRAM

## 2024-03-12 PROCEDURE — 99255 IP/OBS CONSLTJ NEW/EST HI 80: CPT | Performed by: INTERNAL MEDICINE

## 2024-03-12 RX ORDER — DEXMEDETOMIDINE HYDROCHLORIDE 4 UG/ML
INJECTION, SOLUTION INTRAVENOUS CONTINUOUS
Status: DISCONTINUED | OUTPATIENT
Start: 2024-03-12 | End: 2024-03-16

## 2024-03-12 RX ORDER — CHLORDIAZEPOXIDE HYDROCHLORIDE 25 MG/1
25 CAPSULE, GELATIN COATED ORAL EVERY 8 HOURS
Status: DISCONTINUED | OUTPATIENT
Start: 2024-03-13 | End: 2024-03-12

## 2024-03-12 RX ORDER — LORAZEPAM 1 MG/1
1 TABLET ORAL EVERY 4 HOURS PRN
Status: DISCONTINUED | OUTPATIENT
Start: 2024-03-12 | End: 2024-03-13

## 2024-03-12 RX ORDER — PHENOBARBITAL SODIUM 65 MG/ML
32.5 INJECTION, SOLUTION INTRAMUSCULAR; INTRAVENOUS EVERY 8 HOURS
Status: COMPLETED | OUTPATIENT
Start: 2024-03-16 | End: 2024-03-18

## 2024-03-12 RX ORDER — POTASSIUM CHLORIDE 20 MEQ/1
40 TABLET, EXTENDED RELEASE ORAL EVERY 4 HOURS
Status: COMPLETED | OUTPATIENT
Start: 2024-03-12 | End: 2024-03-12

## 2024-03-12 RX ORDER — CHLORDIAZEPOXIDE HYDROCHLORIDE 25 MG/1
25 CAPSULE, GELATIN COATED ORAL EVERY 6 HOURS
Status: DISCONTINUED | OUTPATIENT
Start: 2024-03-12 | End: 2024-03-12

## 2024-03-12 RX ORDER — THIAMINE HYDROCHLORIDE 100 MG/ML
100 INJECTION, SOLUTION INTRAMUSCULAR; INTRAVENOUS DAILY
Status: DISCONTINUED | OUTPATIENT
Start: 2024-03-12 | End: 2024-03-12

## 2024-03-12 RX ORDER — HALOPERIDOL 5 MG/ML
2 INJECTION INTRAMUSCULAR EVERY 8 HOURS PRN
Status: DISCONTINUED | OUTPATIENT
Start: 2024-03-12 | End: 2024-03-18

## 2024-03-12 RX ORDER — CHLORDIAZEPOXIDE HYDROCHLORIDE 25 MG/1
25 CAPSULE, GELATIN COATED ORAL EVERY 24 HOURS
Status: DISCONTINUED | OUTPATIENT
Start: 2024-03-15 | End: 2024-03-12

## 2024-03-12 RX ORDER — LORAZEPAM 2 MG/ML
INJECTION INTRAMUSCULAR
Status: DISCONTINUED
Start: 2024-03-12 | End: 2024-03-12 | Stop reason: WASHOUT

## 2024-03-12 RX ORDER — DIAZEPAM 5 MG/ML
10 INJECTION, SOLUTION INTRAMUSCULAR; INTRAVENOUS ONCE
Status: COMPLETED | OUTPATIENT
Start: 2024-03-12 | End: 2024-03-12

## 2024-03-12 RX ORDER — DOXEPIN HYDROCHLORIDE 50 MG/1
1 CAPSULE ORAL DAILY
Status: DISCONTINUED | OUTPATIENT
Start: 2024-03-12 | End: 2024-03-12

## 2024-03-12 RX ORDER — DIAZEPAM 5 MG/ML
10 INJECTION, SOLUTION INTRAMUSCULAR; INTRAVENOUS 3 TIMES DAILY
Status: DISCONTINUED | OUTPATIENT
Start: 2024-03-12 | End: 2024-03-12

## 2024-03-12 RX ORDER — DIAZEPAM 5 MG/ML
INJECTION, SOLUTION INTRAMUSCULAR; INTRAVENOUS
Status: COMPLETED
Start: 2024-03-12 | End: 2024-03-12

## 2024-03-12 RX ORDER — PHENOBARBITAL SODIUM 65 MG/ML
65 INJECTION, SOLUTION INTRAMUSCULAR; INTRAVENOUS EVERY 8 HOURS
Status: COMPLETED | OUTPATIENT
Start: 2024-03-14 | End: 2024-03-16

## 2024-03-12 RX ORDER — CHLORDIAZEPOXIDE HYDROCHLORIDE 25 MG/1
25 CAPSULE, GELATIN COATED ORAL EVERY 12 HOURS
Status: DISCONTINUED | OUTPATIENT
Start: 2024-03-14 | End: 2024-03-12

## 2024-03-12 RX ORDER — PHENOBARBITAL SODIUM 65 MG/ML
97.5 INJECTION, SOLUTION INTRAMUSCULAR; INTRAVENOUS EVERY 8 HOURS
Status: COMPLETED | OUTPATIENT
Start: 2024-03-12 | End: 2024-03-14

## 2024-03-12 RX ORDER — POTASSIUM CHLORIDE 20 MEQ/1
40 TABLET, EXTENDED RELEASE ORAL EVERY 4 HOURS
Status: DISCONTINUED | OUTPATIENT
Start: 2024-03-12 | End: 2024-03-12

## 2024-03-12 RX ORDER — LORAZEPAM 2 MG/1
2 TABLET ORAL EVERY 2 HOUR PRN
Status: DISCONTINUED | OUTPATIENT
Start: 2024-03-12 | End: 2024-03-13

## 2024-03-12 RX ORDER — LORAZEPAM 2 MG/ML
5 INJECTION INTRAMUSCULAR
Status: DISCONTINUED | OUTPATIENT
Start: 2024-03-12 | End: 2024-03-13

## 2024-03-12 RX ORDER — LORAZEPAM 2 MG/ML
1 INJECTION INTRAMUSCULAR EVERY 4 HOURS PRN
Status: DISCONTINUED | OUTPATIENT
Start: 2024-03-12 | End: 2024-03-13

## 2024-03-12 RX ORDER — LORAZEPAM 2 MG/ML
3 INJECTION INTRAMUSCULAR
Status: DISCONTINUED | OUTPATIENT
Start: 2024-03-12 | End: 2024-03-13

## 2024-03-12 RX ORDER — LORAZEPAM 2 MG/ML
2 INJECTION INTRAMUSCULAR EVERY 2 HOUR PRN
Status: DISCONTINUED | OUTPATIENT
Start: 2024-03-12 | End: 2024-03-13

## 2024-03-12 RX ORDER — VANCOMYCIN HYDROCHLORIDE 125 MG/1
125 CAPSULE ORAL 4 TIMES DAILY
Status: DISCONTINUED | OUTPATIENT
Start: 2024-03-12 | End: 2024-03-13

## 2024-03-12 RX ORDER — LORAZEPAM 2 MG/ML
6 INJECTION INTRAMUSCULAR EVERY 10 MIN PRN
Status: DISCONTINUED | OUTPATIENT
Start: 2024-03-12 | End: 2024-03-13

## 2024-03-12 RX ORDER — LORAZEPAM 2 MG/ML
4 INJECTION INTRAMUSCULAR EVERY 30 MIN PRN
Status: DISCONTINUED | OUTPATIENT
Start: 2024-03-12 | End: 2024-03-13

## 2024-03-12 NOTE — PLAN OF CARE
Pt is a&ox4, anxiety noted, on RA, denies pain, continuous pulse ox and telemetry in place, IV fluids infusing to PIC per eMAR order.  Therapy on floor- recommends  Up with 2 assist for safety.  Seizure precautions in place, CIWA protocol ordered.   -Pt is anxious and keeps trying to exit bed, staff having multiple conversations with pt to not get up out of the bed without help. Pt needs continuous redirection  1036-[facility] PCT notified this RN pt had unwitnessed fall, pt was found on all fours on the floor next to the bed by PCT. VSS, no evidence of trauma with this RNs head to toe assessment of the patient.  1055-dr. Guero sepulveda paged to notify pt had an unwitnessed fall. Per MD no new orders at this time.  1100 hr- pts sister alfredo called for update on POC. POC dicussed and family verbalizes understanding.   Pt placed in posey vest and four point rails up.   Pt. Is impulsive and anxious, continually trying to exit bed throughout this RNs shift.   Pt is incontinent of bladder and bowel at this time.  Dr. Sepulveda paged for bilateral wrist restraints.   Pt has discontinued 3 PIV today   Safety precautions are in place.    Problem: PAIN - ADULT  Goal: Verbalizes/displays adequate comfort level or patient's stated pain goal  Description: INTERVENTIONS:  - Encourage pt to monitor pain and request assistance  - Assess pain using appropriate pain scale  - Administer analgesics based on type and severity of pain and evaluate response  - Implement non-pharmacological measures as appropriate and evaluate response  - Consider cultural and social influences on pain and pain management  - Manage/alleviate anxiety  - Utilize distraction and/or relaxation techniques  - Monitor for opioid side effects  - Notify MD/LIP if interventions unsuccessful or patient reports new pain  - Anticipate increased pain with activity and pre-medicate as appropriate  Outcome: Progressing     Problem: SAFETY ADULT - FALL  Goal: Free from fall  injury  Description: INTERVENTIONS:  - Assess pt frequently for physical needs  - Identify cognitive and physical deficits and behaviors that affect risk of falls.  - [place] fall precautions as indicated by assessment.  - Educate pt/family on patient safety including physical limitations  - Instruct pt to call for assistance with activity based on assessment  - Modify environment to reduce risk of injury  - Provide assistive devices as appropriate  - Consider OT/PT consult to assist with strengthening/mobility  - Encourage toileting schedule  Outcome: Progressing     Problem: DISCHARGE PLANNING  Goal: Discharge to home or other facility with appropriate resources  Description: INTERVENTIONS:  - Identify barriers to discharge w/pt and caregiver  - Include patient/family/discharge partner in discharge planning  - Arrange for needed discharge resources and transportation as appropriate  - Identify discharge learning needs (meds, wound care, etc)  - Arrange for interpreters to assist at discharge as needed  - Consider post-discharge preferences of patient/family/discharge partner  - Complete POLST form as appropriate  - Assess patient's ability to be responsible for managing their own health  - Refer to Case Management Department for coordinating discharge planning if the patient needs post-hospital services based on physician/LIP order or complex needs related to functional status, cognitive ability or social support system  Outcome: Progressing     Problem: SKIN/TISSUE INTEGRITY - ADULT  Goal: Incision(s), wounds(s) or drain site(s) healing without S/S of infection  Description: INTERVENTIONS:  - Assess and document risk factors for pressure ulcer development  - Assess and document skin integrity  - Assess and document dressing/incision, wound bed, drain sites and surrounding tissue  - Implement wound care per orders  - Initiate isolation precautions as appropriate  - Initiate Pressure Ulcer prevention bundle as  indicated  Outcome: Progressing     Problem: NEUROLOGICAL - ADULT  Goal: Absence of seizures  Description: INTERVENTIONS  - Monitor for seizure activity  - Administer anti-seizure medications as ordered  - Monitor neurological status  Outcome: Progressing  Goal: Achieves maximal functionality and self care  Description: INTERVENTIONS  - Monitor swallowing and airway patency with patient fatigue and changes in neurological status  - Encourage and assist patient to increase activity and self care with guidance from PT/OT  - Encourage visually impaired, hearing impaired and aphasic patients to use assistive/communication devices  Outcome: Progressing

## 2024-03-12 NOTE — PROGRESS NOTES
[facility]  Progress Note    Philip Castellano Patient Status:  Inpatient    1985 MRN [id number]   Location [facility] 3NE-A Attending Guero Sepulveda DO   Hosp Day # 1 PCP Tish Guzman     Subjective:  The patient was seen and examined at bedside.  No acute events overnight.  The patient does not complain of any significant pain today.  He denies abdominal pain.  He denies nausea or vomiting.  He notes that his nose is tender where his laceration is.    The patient is being prepped for an EEG at the time of today's exam.    Hemoglobin slightly decreased to 11 today from 13.2 yesterday.  The patient has been tachycardic with a maximum heart rate of 140.  Elevated blood pressures.    Objective/Physical Exam:  BP (!) 135/103 (BP Location: Right arm)   Pulse 120   Temp 99.6 °F (37.6 °C) (Oral)   Resp 20   Wt 160 lb (72.6 kg)   SpO2 96%   BMI 21.11 kg/m²     Intake/Output Summary (Last 24 hours) at 3/12/2024 1003  Last data filed at 3/12/2024 0500  Gross per 24 hour   Intake 988 ml   Output 600 ml   Net 388 ml         General: Alert, oriented x3. No acute distress.  HEENT: Normocephalic, atraumatic. No scleral icterus.  Nose laceration.  No cervical pain or deformity.  Pulmonary: No respiratory distress, effort normal.   Abdomen: Non-distended, without tympany to percussion. Soft, non-tender to palpation. No rebound or guarding. No peritoneal signs.   Extremities: No lower extremity edema. No clubbing or cyanosis.   Skin: Warm, dry. No jaundice.       Labs:  Lab Results   Component Value Date    WBC 5.9 2024    HGB 11.0 2024    HCT 32.0 2024    PLT 60.0 2024      No results found for: \"PT\", \"INR\"  Lab Results   Component Value Date     2024    K 2.5 2024    CL 95 2024    CO2 30.0 2024    BUN 4 2024    CREATSERUM 0.64 2024    GLU 74 2024    CA 7.7 2024    ALKPHO 57 2024    ALT 30 2024    AST 69 2024     BILT 1.8 03/12/2024    ALB 3.9 03/12/2024    TP 6.7 03/12/2024     Lab Results   Component Value Date    COLORUR Light-Yellow 03/11/2024    CLARITY Clear 03/11/2024    SPECGRAVITY >1.030 03/11/2024    GLUUR Normal 03/11/2024    BILUR Negative 03/11/2024    KETUR 80 03/11/2024    BLOODURINE 2+ 03/11/2024    PHURINE 5.5 03/11/2024    PROUR 100 03/11/2024    UROBILINOGEN Normal 03/11/2024    NITRITE Negative 03/11/2024    LEUUR Negative 03/11/2024       Assessment:  Patient Active Problem List   Diagnosis    Alcohol withdrawal seizure without complication (HCC)    Seizures (HCC)    Closed head injury, initial encounter    Scalp laceration, initial encounter    Contusion of nose, initial encounter    Thrombocytopenia (HCC)    Metabolic acidosis    Trauma     Level 2 trauma  Status post fall with EtOH, seizure     Plan:  Neurology on consult-EEG in process  Medical management per primary  Continue to monitor for signs and symptoms of active bleeding   Antiemetics and analgesics as needed  Ambulate as able  Diet as tolerated      The patient was discussed with Dr. Rick , and she is in agreement with the assessment and plan. My total face time with this patient was 25 minutes. Greater than half of the visit was spent in counseling the patient on the above listed diagnoses and treatment options.     Jesenia Baker PA-C  3/12/2024  10:03 AM    ADDENDUM:     Patient was seen and examined by me. I agree with the above note.  No acute events overnight.  Patient currently on currently undergoing EEG monitoring at the time of exam.  Patient denies any new issues today.    General: Alert, orientated x3. Cooperative. No apparent distress.  Laceration to bridge of nose hemostatic  Pulmonary: non labored breathing, effort normal  Abdomen: Soft, non-distended, nontender to palpation  Back: No C-spine tenderness  Extremities: warm, no edema or cyanosis, compartments soft        A/P 38 year old male status post fall and  seizure     No acute trauma surgery intervention at this time  Tertiary survey performed today  No new issues or injuries found on exam  Patient is more alert today and appropriate  Primary team continuing workup for seizure activity  Trauma surgery will sign off at this time  Please page with any questions or concerns, especially any change in patient's condition  Rest of care per primary    Thank you for let me participate in the care of this patient     This note was initiated by Jesenia Baker PA-C.  The PA saw the patient in conjunction with me. The PA performed a history, exam, and developed the assessment and plan. I agree with the mentioned assessment and plan and have provided further documentation of my own, if necessary.       Freida Rick MD  [facility] General Surgery  3/12/2024  8:50 PM

## 2024-03-12 NOTE — PLAN OF CARE
Assumed care at 1930  Received pt on room air.  Alert to self, place and situation,unsure day of the month  Follows commands  Hematoma on the tongue, per pt he bit it.  CIWA 4-8, tremors noted, denies hallucination, anxious.  Ativan given.  Able to sleep.  C/o sore throat, Motrin given.  IVF, Folic acid, Thiamine given.  ST on tele 's-140s (when moving, using urinal)  Plan:Psych, PT/OT to see, EEG.  Problem: Patient/Family Goals  Goal: Patient/Family Long Term Goal  Description: Patient's Long Term Goal: abstain from alcohol    Interventions:  - Psych to see  -CIWA protocol  -medicines  -comply with POC  - follow up care  - See additional Care Plan goals for specific interventions  Outcome: Progressing  Goal: Patient/Family Short Term Goal  Description: Patient's Short Term Goal: be comfortable    Interventions:   - Ativan PRN  -Pain med  -needs attended  - See additional Care Plan goals for specific interventions  Outcome: Progressing     Problem: PAIN - ADULT  Goal: Verbalizes/displays adequate comfort level or patient's stated pain goal  Description: INTERVENTIONS:  - Encourage pt to monitor pain and request assistance  - Assess pain using appropriate pain scale  - Administer analgesics based on type and severity of pain and evaluate response  - Implement non-pharmacological measures as appropriate and evaluate response  - Consider cultural and social influences on pain and pain management  - Manage/alleviate anxiety  - Utilize distraction and/or relaxation techniques  - Monitor for opioid side effects  - Notify MD/LIP if interventions unsuccessful or patient reports new pain  - Anticipate increased pain with activity and pre-medicate as appropriate  Outcome: Progressing     Problem: SAFETY ADULT - FALL  Goal: Free from fall injury  Description: INTERVENTIONS:  - Assess pt frequently for physical needs  - Identify cognitive and physical deficits and behaviors that affect risk of falls.  - [place] fall  precautions as indicated by assessment.  - Educate pt/family on patient safety including physical limitations  - Instruct pt to call for assistance with activity based on assessment  - Modify environment to reduce risk of injury  - Provide assistive devices as appropriate  - Consider OT/PT consult to assist with strengthening/mobility  - Encourage toileting schedule  Outcome: Progressing     Problem: DISCHARGE PLANNING  Goal: Discharge to home or other facility with appropriate resources  Description: INTERVENTIONS:  - Identify barriers to discharge w/pt and caregiver  - Include patient/family/discharge partner in discharge planning  - Arrange for needed discharge resources and transportation as appropriate  - Identify discharge learning needs (meds, wound care, etc)  - Arrange for interpreters to assist at discharge as needed  - Consider post-discharge preferences of patient/family/discharge partner  - Complete POLST form as appropriate  - Assess patient's ability to be responsible for managing their own health  - Refer to Case Management Department for coordinating discharge planning if the patient needs post-hospital services based on physician/LIP order or complex needs related to functional status, cognitive ability or social support system  Outcome: Progressing     Problem: SKIN/TISSUE INTEGRITY - ADULT  Goal: Incision(s), wounds(s) or drain site(s) healing without S/S of infection  Description: INTERVENTIONS:  - Assess and document risk factors for pressure ulcer development  - Assess and document skin integrity  - Assess and document dressing/incision, wound bed, drain sites and surrounding tissue  - Implement wound care per orders  - Initiate isolation precautions as appropriate  - Initiate Pressure Ulcer prevention bundle as indicated  Outcome: Progressing     Problem: NEUROLOGICAL - ADULT  Goal: Absence of seizures  Description: INTERVENTIONS  - Monitor for seizure activity  - Administer anti-seizure  medications as ordered  - Monitor neurological status  Outcome: Progressing  Goal: Achieves maximal functionality and self care  Description: INTERVENTIONS  - Monitor swallowing and airway patency with patient fatigue and changes in neurological status  - Encourage and assist patient to increase activity and self care with guidance from PT/OT  - Encourage visually impaired, hearing impaired and aphasic patients to use assistive/communication devices  Outcome: Progressing

## 2024-03-12 NOTE — CONSULTS
[facility]  [facility] Neurology Consult Note    Tresa Villar Patient Status:  Inpatient    1985 MRN [id number]   Location [facility] 3NE-A Attending Guero Sepulveda, DO   Hosp Day # 1 PCP Tish Guzman     REASON FOR EVALUATION: Seizure      HISTORY OF PRESENT ILLNESS: TRESA VILLAR is a 38 year old male with history of ETOH abuse with no prior ETOH withdrawals or withdrawal seizures who was found unresponsive at home after his mother heard him fall. When patient was evaluated in the ED, he reportedly had a 15 second seizure which was witnessed by staff. Patient states he does not remember coming to the hospital. He only remembers being at home, then waking up with blood in his mouth and seeing he was in a hospital room. Patient reportedly was post ictal with confusion after this. Patient states he drinks about a \"pint up to a bottle of vodka everyday\" and that his last drink was the night prior to his admission to the hospital ( night). He reportedly had some marijuana prior to admission as well. Patient reports he feels well overall this morning.       PAST MEDICAL HISTORY:  Past Medical History:   Diagnosis Date    Alcoholism (HCC)     Clavicle fracture        PAST SURGICAL HISTORY:  No past surgical history on file.    FAMILY HISTORY:  family history includes Hypertension in his father and mother.    SOCIAL HISTORY:   reports that he has quit smoking. He uses smokeless tobacco. He reports current alcohol use. He reports that he does not use drugs.    ALLERGIES:  No Known Allergies    MEDICATIONS:  No current outpatient medications on file.     Current Facility-Administered Medications   Medication Dose Route Frequency    potassium chloride (K-Dur) tab 40 mEq  40 mEq Oral Q4H    thiamine 100 mg/mL injection 100 mg  100 mg Intravenous Daily    folic acid (Folvite) 1 mg in sodium chloride 0.9% 50 mL IVPB  1 mg Intravenous Daily    multivitamin (Tab-A-Tara/Beta Carotene) tab 1 tablet  1 tablet Oral  Daily    sodium chloride 0.9% infusion   Intravenous Continuous    acetaminophen (Tylenol Extra Strength) tab 500 mg  500 mg Oral Q4H PRN    ibuprofen (Motrin) tab 200 mg  200 mg Oral Q4H PRN    Or    ibuprofen (Motrin) tab 400 mg  400 mg Oral Q4H PRN    Or    ibuprofen (Motrin) tab 600 mg  600 mg Oral Q4H PRN    LORazepam (Ativan) tab 1 mg  1 mg Oral Q1H PRN    Or    LORazepam (Ativan) 2 mg/mL injection 1 mg  1 mg Intravenous Q1H PRN    Or    LORazepam (Ativan) tab 2 mg  2 mg Oral Q1H PRN    Or    LORazepam (Ativan) 2 mg/mL injection 2 mg  2 mg Intravenous Q1H PRN    ondansetron (Zofran) 4 MG/2ML injection 4 mg  4 mg Intravenous Q6H PRN    prochlorperazine (Compazine) 10 MG/2ML injection 5 mg  5 mg Intravenous Q8H PRN       REVIEW OF SYSTEMS:  A 10-point system was reviewed.  Pertinent positives and negatives are noted in HPI.      PHYSICAL EXAMINATION:  VITAL SIGNS: BP (!) 135/103 (BP Location: Right arm)   Pulse 120   Temp 99.6 °F (37.6 °C) (Oral)   Resp 20   Wt 160 lb (72.6 kg)   SpO2 96%   BMI 21.11 kg/m²   GENERAL:  Patient is a 38 year old male in no acute distress.  PSYCH: flat affect  HEENT:  Normocephalic, atraumatic  NECK: Symmetrical, atraumatic  LUNGS: Regular rate, no accessory muscle use  HEART: Regular rate and rhythm.  ABD: Soft, non tender  SKIN: Warm, dry, no rashes  EXTREMITIES: Symmetrical     NEUROLOGICAL:   Mental status: Oriented to person, place, and time   Speech: some slurred speech given tongue swollen and bruised on left side due to biting during seizure  Memory and comprehension: Intact   Cranial Nerves: VFF, PERRL 3mm brisk, EOMI, no nystagmus, facial sensation intact, face symmetric, tongue midline, shoulder shrug equal, remainder CN intact  Motor: No drift, no focal arm or leg weakness- bilateral UE intention tremors noted  Gait: Deferred        DIAGNOSTIC DATA:  Labs:  Recent Labs   Lab 03/11/24  1443 03/12/24  0555   RBC 3.93* 3.35*   HGB 13.2 11.0*   HCT 38.5* 32.0*   MCV  98.0 95.5   MCH 33.6 32.8   MCHC 34.3 34.4   RDW 13.0 13.0   NEPRELIM 4.62 4.50   WBC 6.7 5.9   PLT 96.0* 60.0*       Recent Labs   Lab 03/11/24  1443 03/12/24  0555   * 74   BUN 5* 4*   CREATSERUM 1.06 0.64*   EGFRCR 91 123   CA 8.7 7.7*    134*   K 3.7 2.5*    95*   CO2 14.0* 30.0           IMAGING:  XR CHEST AP PORTABLE  (CPT=71045)    Result Date: 3/11/2024  PROCEDURE:  XR CHEST AP PORTABLE  (CPT=71045)  TECHNIQUE:  AP chest radiograph was obtained.  COMPARISON:  None.  INDICATIONS:  mom heard thud, heavy ETOH use. Left hematoma, neck lac. Found in tub 238 level 2 trauma called. was coming around for EMS, seizing on arrival.  PATIENT STATED HISTORY: (As transcribed by Technologist)  Pt.   mom heard thud, heavy ETOH use. Left hematoma, neck lac. Found in tub 238 level 2 trauma called. was coming around for EMS, seizing on arrival.   FINDINGS: Cardiac silhouette and pulmonary vasculature are unremarkable. No consolidation, pleural effusion or pneumothorax.  Prominence of the right hilum is noted. IMPRESSION: No consolidation.  Prominence of the right hilum is noted.  A CT chest examination with contrast evaluate this region to exclude a mass is recommended.   LOCATION:  [facility]      Dictated by (CST): Agustin Rapp MD on 3/11/2024 at 5:53 PM     Finalized by (CST): Agustin Rapp MD on 3/11/2024 at 5:54 PM       CT ABDOMEN+PELVIS(CONTRAST ONLY)(CPT=74177)    Result Date: 3/11/2024  CONCLUSION:  1. Nonspecific fat stranding surrounding the ascending colon and cecum.  Findings may be related to nonspecific colitis. 2. Severe diffuse hepatic steatosis.   LOCATION:  [facility]   Dictated by ([facility]): Sadiq Gordon MD on 3/11/2024 at 5:08 PM     Finalized by (CST): Sadiq Gordon MD on 3/11/2024 at 5:11 PM       CT BRAIN OR HEAD (41620)    Result Date: 3/11/2024  CONCLUSION:   1. Negative for acute intracranial process.  2. Right frontal parietal scalp laceration and hematoma without calvarial  fracture.    LOCATION:  [id number]   Dictated by (CST): Deandre Villalta MD on 3/11/2024 at 3:32 PM     Finalized by (CST): Deandre Villalta MD on 3/11/2024 at 3:34 PM       CT SPINE CERVICAL (CPT=72125)    Result Date: 3/11/2024  CONCLUSION:   1. No evidence of acute traumatic injury of the cervical spine.  2. Disc and endplate degenerative changes at C5-6 resulting in mild central canal stenosis and moderate left-sided foraminal stenosis.     LOCATION:  [id number]   Dictated by (CST): Deandre Villalta MD on 3/11/2024 at 3:08 PM     Finalized by (CST): Deandre Villalta MD on 3/11/2024 at 3:12 PM           Principal Problem:    Alcohol withdrawal seizure without complication (HCC)  Active Problems:    Seizures (HCC)    Closed head injury, initial encounter    Scalp laceration, initial encounter    Contusion of nose, initial encounter    Thrombocytopenia (HCC)    Metabolic acidosis    Trauma        Assessment/ Plan  Seizure likely 2/2 ETOH withdrawal   -CT brain negative for acute intracranial abnormalities  -EEG negative for epileptiform activity, moderate diffuse slowing consistent with encephalopathy  -No AED indicated at this time as this is a first time seizure and likely provoked  -Continues on CIWA protocol  -Discussed with patient regarding seizure due to alcohol intake, educated that he needs to be monitored currently as he is currently in the ETOH withdrawal window and staff can monitor/ give medication per protocol if patient exhibits withdrawal symptoms, all questions answered at this time      Dr. Samano to follow with further recommendations.  Is this a shared or split note between Advanced Practice Provider and Physician? Yes     GREGG Herrera  [facility]  3/12/2024, 8:35 AM  Spectra [id number]

## 2024-03-12 NOTE — PAYOR COMM NOTE
--------------  ADMISSION REVIEW     Payor: ROLAND  Subscriber #:  [id number]  Authorization Number: [id number]    Admit date: 3/11/24  Admit time:  6:21 PM       REVIEW DOCUMENTATION:     ED Provider Notes        History     Chief Complaint   Patient presents with    Trauma 1 & 2     Stated Complaint: mom heard thud, heavy ETOH use. Left hematoma, neck lac. Found in tub 238 level*    Subjective:   HPI    38-year-old male with a history of alcohol abuse was brought to the emergency department by paramedics after they were called to the residence.  The patient's mother reportedly heard 2 falls and found the patient in the bathroom with a head wound and very stuporous.  His mentation cleared by the time he came to the emergency department but upon arrival he had a generalized seizure.  Because of the patient's level of confusion he is unable to provide any additional history at this time.    Physical Exam     ED Triage Vitals   BP 03/11/24 1451 (!) 118/104   Pulse 03/11/24 1451 (!) 128   Resp 03/11/24 1451 16   Temp 03/11/24 1530 99.2 °F (37.3 °C)   Temp src 03/11/24 1530 Temporal   SpO2 03/11/24 1451 97 %   O2 Device 03/11/24 1451 None (Room air)       Current:BP (!) 146/97   Pulse 109   Temp 99.2 °F (37.3 °C) (Temporal)   Resp 18   SpO2 97%         Physical Exam    General appearance upon arrival: This is a young adult male who is moving about on a gurney and nonverbal.  Vital signs were reviewed per nursing notes.  Monitor revealed a sinus tachycardia rate of 1 10-1 30.  Pulse oximetry was approximately 95%.  Respirations were unlabored.  HEENT: Normocephalic.  Pupils are equal round reactive to light.  There is a subcentimeter laceration over the nasal bridge with soft tissue swelling.  There is an abrasion over a right parieto-occipital cephalohematoma which is approximately 5 cm in diameter.  Dried blood in the mouth but no large laceration to discerned.  No malocclusion grossly.  Neck: C-collar had been in  place however the patient ripped this off upon arrival.  No adenopathy or thyromegaly.  Lungs are clear to auscultation.  Heart exam: Normal S1-S2 without extra sounds or murmurs.  Regular rate and rhythm.  Chest and abdomen are nontender.  Extremities are atraumatic.  Skin is dry without other rashes or lesions.  Neuroexam: Restless, nonverbal and moving about on the cart consistent with post ictal behavior.    ED Course     Labs Reviewed   COMP METABOLIC PANEL (14) - Abnormal; Notable for the following components:       Result Value    Glucose 128 (*)     CO2 14.0 (*)     Anion Gap 27 (*)     BUN 5 (*)      (*)     All other components within normal limits   ETHYL ALCOHOL - Abnormal; Notable for the following components:    Ethyl Alcohol 151 (*)     All other components within normal limits   CBC W/ DIFFERENTIAL - Abnormal; Notable for the following components:    RBC 3.93 (*)     HCT 38.5 (*)     PLT 96.0 (*)     All other components within normal limits     EKG    Rate, intervals and axes as noted on EKG Report.  Rate: 112  Rhythm: Accelerated junctional rhythm  Reading: Nonspecific ST abnormality.  Prolonged QT.  Abnormal EKG.  Agree with EKG report.     Intravenous access was obtained.  Laboratory studies were drawn.  The patient was treated with 2 mg of Ativan and 4 mg of Zofran IV push.    Keppra 1500 mg IV piggyback was ordered.    CT ABDOMEN+PELVIS(CONTRAST ONLY)(CPT=74177)    Result Date: 3/11/2024  PROCEDURE:  CT ABDOMEN+PELVIS (CONTRAST ONLY) (CPT=74177)  CONCLUSION:  1. Nonspecific fat stranding surrounding the ascending colon and cecum.  Findings may be related to nonspecific colitis. 2. Severe diffuse hepatic steatosis.   LOCATION:  [id number]   Dictated by (CST): Sadiq Gordon MD on 3/11/2024 at 5:08 PM     Finalized by (CST): Sadiq Gordon MD on 3/11/2024 at 5:11 PM       CT BRAIN OR HEAD (65421)    Result Date: 3/11/2024  PROCEDURE:  CT BRAIN OR HEAD (56374)  C  CONCLUSION:   1. Negative for  acute intracranial process.  2. Right frontal parietal scalp laceration and hematoma without calvarial fracture.    LOCATION:  [id number]   Dictated by (CST): Deandre Villalta MD on 3/11/2024 at 3:32 PM     Finalized by (CST): Deandre Villalta MD on 3/11/2024 at 3:34 PM       CT SPINE CERVICAL (CPT=72125)    Result Date: 3/11/2024  PROCEDURE:  CT SPINE CERVICAL (CPT=72125)  C  CONCLUSION:   1. No evidence of acute traumatic injury of the cervical spine.  2. Disc and endplate degenerative changes at C5-6 resulting in mild central canal stenosis and moderate left-sided foraminal stenosis.     LOCATION:  [id number]   Dictated by (CST): Deandre Villalta MD on 3/11/2024 at 3:08 PM     Finalized by (CST): Deandre Villalta MD on 3/11/2024 at 3:12 PM           MDM      #1.  Alcohol withdrawal seizures.  Altered mentation at home may have been the result of the head injury but also an unwitnessed seizure.  Given intravenous Ativan.  Loaded with Keppra.  2.  Closed head injury.  3.  Scalp laceration.  4.  Nasal contusion.  Notable head trauma without intracranial hemorrhage.  Cervical spine shows no fracture on CT.  5.  Thrombocytopenia.  6.  Metabolic acidosis.  Admission disposition: 3/11/2024  4:03 PM      Disposition and Plan     Clinical Impression:  1. Alcohol withdrawal seizure without complication (HCC)    2. Closed head injury, initial encounter    3. Scalp laceration, initial encounter    4. Contusion of nose, initial encounter    5. Thrombocytopenia (HCC)    6. Metabolic acidosis         Disposition:  Admit  3/11/2024  4:03 pm    Follow-        Signed by Corrina Hughes MD on 3/11/2024  5:25 PM           HISTORY AND PHYSICAL      Assessment & Plan:   #Seizures  -suspect ETOH withdrawal seizures  -given keppra in ER  -EEG  -CT brain negative      # Alcohol withdrawls  -WA protocol     # Elevated AST and TCP  -due to ETOH abuse  -trend     # Facial / nose laceration and Partial hematoma     Plan of care discussed with patient and ER  physician      3-12-24 NEUROLOGY CONSULT    REASON FOR EVALUATION: Seizure        HISTORY OF PRESENT ILLNESS: TRESA VILLAR is a 38 year old male with history of ETOH abuse with no prior ETOH withdrawals or withdrawal seizures who was found unresponsive at home after his mother heard him fall. When patient was evaluated in the ED, he reportedly had a 15 second seizure which was witnessed by staff. Patient states he does not remember coming to the hospital. He only remembers being at home, then waking up with blood in his mouth and seeing he was in a hospital room. Patient reportedly was post ictal with confusion after this. Patient states he drinks about a \"pint up to a bottle of vodka everyday\" and that his last drink was the night prior to his admission to the hospital (Aris night). He reportedly had some marijuana prior to admission as well. Patient reports he feels well overall this morning.       [facility]  [facility] Neurology Consult Note           Tresa Villar Patient Status:  Inpatient    1985 MRN [id number]   Location [facility] 3NE-A Attending Guero Sepulveda DO   Hosp Day # 1 PCP Tish Guzman      REASON FOR EVALUATION: Seizure        HISTORY OF PRESENT ILLNESS: TRESA VILLAR is a 38 year old male with history of ETOH abuse with no prior ETOH withdrawals or withdrawal seizures who was found unresponsive at home after his mother heard him fall. When patient was evaluated in the ED, he reportedly had a 15 second seizure which was witnessed by staff. Patient states he does not remember coming to the hospital. He only remembers being at home, then waking up with blood in his mouth and seeing he was in a hospital room. Patient reportedly was post ictal with confusion after this. Patient states he drinks about a \"pint up to a bottle of vodka everyday\" and that his last drink was the night prior to his admission to the hospital (Aris night). He reportedly had some marijuana prior to admission as  well. Patient reports he feels well overall this morning.          PHYSICAL EXAMINATION:  VITAL SIGNS: BP (!) 135/103 (BP Location: Right arm)   Pulse 120   Temp 99.6 °F (37.6 °C) (Oral)   Resp 20   Wt 160 lb (72.6 kg)   SpO2 96%   BMI 21.11 kg/m²   GENERAL:  Patient is a 38 year old male in no acute distress.  PSYCH: flat affect  HEENT:  Normocephalic, atraumatic  NECK: Symmetrical, atraumatic  LUNGS: Regular rate, no accessory muscle use  HEART: Regular rate and rhythm.  ABD: Soft, non tender  SKIN: Warm, dry, no rashes  EXTREMITIES: Symmetrical      NEUROLOGICAL:   Mental status: Oriented to person, place, and time   Speech: some slurred speech given tongue swollen and bruised on left side due to biting during seizure  Memory and comprehension: Intact   Cranial Nerves: VFF, PERRL 3mm brisk, EOMI, no nystagmus, facial sensation intact, face symmetric, tongue midline, shoulder shrug equal, remainder CN intact  Motor: No drift, no focal arm or leg weakness- bilateral UE intention tremors noted  Gait: Deferred      DIAGNOSTIC DATA:  Labs:       Recent Labs   Lab 03/11/24  1443 03/12/24  0555   RBC 3.93* 3.35*   HGB 13.2 11.0*   HCT 38.5* 32.0*   MCV 98.0 95.5   MCH 33.6 32.8   MCHC 34.3 34.4   RDW 13.0 13.0   NEPRELIM 4.62 4.50   WBC 6.7 5.9   PLT 96.0* 60.0*              Recent Labs   Lab 03/11/24  1443 03/12/24  0555   * 74   BUN 5* 4*   CREATSERUM 1.06 0.64*   EGFRCR 91 123   CA 8.7 7.7*    134*   K 3.7 2.5*    95*   CO2 14.0* 30.0        Assessment/ Plan  Seizure likely 2/2 ETOH withdrawal   -CT brain negative for acute intracranial abnormalities  -EEG negative for epileptiform activity, moderate diffuse slowing consistent with encephalopathy  -No AED indicated at this time as this is a first time seizure and likely provoked  -Continues on CIWA protocol  -Discussed with patient regarding seizure due to alcohol intake, educated that he needs to be monitored currently as he is currently in  the ETOH withdrawal window and staff can monitor/ give medication per protocol if patient exhibits withdrawal symptoms, all questions answered at this time            MEDICATIONS ADMINISTERED IN LAST 1 DAY:  folic acid (Folvite) 1 mg in sodium chloride 0.9% 50 mL IVPB       Date Action Dose Route User    3/12/2024 0854 New Bag 1 mg Intravenous Annika Mcmanus RN    3/11/2024 2051 New Bag 1 mg Intravenous Katlyn Rowley RN          ibuprofen (Motrin) tab 200 mg       Date Action Dose Route User    3/11/2024 2358 Given 200 mg Oral Katlyn Rowley RN          iohexol (Omnipaque) 350 MG/ML injection via power injector 100 mL       Date Action Dose Route User    3/11/2024 1701 Given 100 mL Intravenous HaJose jayazo          LORazepam (Ativan) tab 1 mg       Date Action Dose Route User    3/12/2024 1041 Given 1 mg Oral Annika Mcmanus RN    3/12/2024 0320 Given 1 mg Oral Katlyn Rowley RN    3/11/2024 2358 Given 1 mg Oral Katlyn Rowley RN    3/11/2024 2208 Given 1 mg Oral Katlyn Rowley RN    3/11/2024 1952 Given by Other 1 mg Oral Katlyn Rowley RN    3/11/2024 1845 Given 1 mg Oral Cecy Mckeon RN lic pend          LORazepam (Ativan) 2 mg/mL injection 1 mg       Date Action Dose Route User    3/12/2024 1520 Given 1 mg Intravenous Bre Flowers RN    3/12/2024 1400 Given 1 mg Intravenous Annika Mcmanus RN          potassium chloride (K-Dur) tab 40 mEq       Date Action Dose Route User    3/12/2024 1358 Given 40 mEq Oral Annika Mcmanus RN    3/12/2024 0853 Given 40 mEq Oral Annika Mcmanus RN          sodium chloride 0.9% infusion 100 ML HR        Date Action Dose Route User    3/12/2024 1526 New Bag (none) Intravenous Bre Flowers RN    3/12/2024 0319 New Bag (none) Intravenous Katlyn Rowley RN    3/11/2024 1947 New Bag (none) Intravenous Katlyn Rowley RN    3/11/2024 1845 New Bag (none) Intravenous Rafaela Dillon, RN          sodium chloride 0.9 % IV bolus 1,000 mL        Date Action Dose Route User    3/11/2024 1552 New Bag 1,000 mL Intravenous Emmie Santillan RN          multivitamin (Tab-A-Tara/Beta Carotene) tab 1 tablet       Date Action Dose Route User    3/12/2024 0854 Given 1 tablet Oral Annika Mcmanus RN    3/11/2024 1837 Given 1 tablet Oral Rafaela Dillon RN          thiamine 100 mg/mL injection 100 mg       Date Action Dose Route User    3/11/2024 1837 Given 100 mg Intravenous Rafaela Dillon RN          thiamine 100 mg/mL injection 100 mg       Date Action Dose Route User    3/12/2024 0854 Given 100 mg Intravenous Annika Mcmanus RN            Vitals (last day)       Date/Time Temp Pulse Resp BP SpO2 Weight O2 Device O2 Flow Rate (L/min) [facility]    03/12/24 1516 -- 119 -- 115/89 -- -- -- --     03/12/24 1400 -- -- -- 140/88 -- -- -- --     03/12/24 1200 98.3 °F (36.8 °C) 115 20 135/94 96 % -- None (Room air) 0 L/min     03/12/24 1040 -- 115 -- -- 96 % -- -- --     03/12/24 1039 -- 116 -- -- 96 % -- -- --     03/12/24 1038 -- 109 -- -- 96 % -- -- --     03/12/24 1037 97.8 °F (36.6 °C) 108 -- 144/98 96 % -- -- --     03/12/24 1028 -- 133 -- -- -- -- -- --     03/12/24 1027 -- 109 -- -- -- -- -- --     03/12/24 1005 -- 155 -- -- -- -- -- -- NC    03/12/24 1000 -- 114 -- 144/98 -- -- -- --     03/12/24 0800 99.6 °F (37.6 °C) 120 20 135/103 96 % -- None (Room air) 0 L/min     03/11/24 1630 -- 109 18 146/97 97 % -- None (Room air) -- ER    03/11/24 1615 -- 130 15 131/98 100 % -- -- -- ER    03/11/24 1545 -- 140 18 102/86 98 % -- -- -- ER    03/11/24 1530 99.2 °F (37.3 °C) -- -- -- -- -- -- -- ER    03/11/24 15:25:54 -- -- -- -- -- -- None (Room air) -- ER    03/11/24 1515 -- 115 18 131/90 97 % -- None (Room air) -- ER    03/11/24 1455 -- 120 18 140/91 97 % -- None (Room air) -- ER    03/11/24 1451 -- 128 16 118/104 97 % -- None (Room air) -- ER          CIWA Scores (since admission)       Date/Time CIWA-Ar Total [facility]    03/12/24 1516 [facility]    03/12/24  1400 7 CS    03/12/24 1200 4 CS    03/12/24 1141 4 CS    03/12/24 1000 7 CS    03/12/24 0800 5 CS    03/12/24 0600 3 AB    03/12/24 0420 4 AB    03/12/24 0320 7 AB    03/12/24 0200 4 AB    03/12/24 0100 6 AB    03/12/24 0000 8 AB    03/11/24 2300 6 AB    03/11/24 2200 8 AB    03/11/24 2052 4 AB    03/11/24 1952 7 AB    03/11/24 1830 8 AA    03/11/24 1754 6 ER    03/11/24 1545 4 ER

## 2024-03-12 NOTE — PROGRESS NOTES
Significant Event - Fall Note    Date/Time of Fall: March 12, 2024 at 1033    Fall huddle completed: Yes    Description of patient fall:     Patient fell from: Bed     Activity when fall occurred: Toileting-related activities     Where did fall occur: Patient room     Was the fall assisted: Found on floor/unassisted to floor    Who witnessed the fall: Unwitnessed    Patient narrative of fall: Pt. Found on floor stated he was trying to go to the bathroom to change is boxers.    Staff narrative of fall: Found on floor by PCT on all fours. Urine on floor. Bed alarm sounding     Name of Provider notified of fall: Dr. Guero Sepulveda    Family notification: Family notified    Factors contributing to fall:     Physical: Deconditioned, Unsteady gait, and Weakness/fatigue     Psychological: Agitation and Impulsive      Environmental: N/A     Medications received in the past 8 hours:   Medication(s) Administered in past 8 Hours from 03/12/2024 0432 to 03/12/2024 1232       Date/Time Order Dose Route Action Action by Comments    03/12/2024 0854 CDT folic acid (Folvite) 1 mg in sodium chloride 0.9% 50 mL IVPB 1 mg Intravenous New Bag Annika Mcmanus RN --    03/12/2024 1041 CDT LORazepam (Ativan) tab 1 mg 1 mg Oral Given Annika Mcmanus RN --    03/12/2024 0854 CDT multivitamin (Tab-A-Tara/Beta Carotene) tab 1 tablet 1 tablet Oral Given Annika Mcmanus RN --    03/12/2024 0853 CDT potassium chloride (K-Dur) tab 40 mEq 40 mEq Oral Given Annika Mcmanus RN --    03/12/2024 0854 CDT thiamine 100 mg/mL injection 100 mg 100 mg Intravenous Given Annika Mcmanus RN --            Was patient identified as high fall risk prior to fall:                                What interventions were in place prior to fall: Bed alarm, Bed in lowest position, Call light within reach, [place] footwear, Patient/family involved in fall prevention plan, Personal items within reach, Initiated PT/OT therapy, Reality orientation, Rounding, and  Toileting regimen    Interventions post fall: Bed alarm, Bed in lowest position, Call light within reach, Chair alarm, Floor mats ([facility] only), [place] footwear, Patient education tool, Patient/family involved in fall prevention plan, Patient situated close to nursing station, Personal items within reach, Initiated PT/OT therapy, Reality orientation, Rounding, Signage in place, and Toileting regimen    Additional comments:

## 2024-03-12 NOTE — PROGRESS NOTES
Psych Liaison routed consult to  to see 03/13 for substance use concerns. Psych Liaison placed referrals for psychotherapy and psychiatry in discharge summary.

## 2024-03-12 NOTE — PROGRESS NOTES
[facility]   part of [facility]     Hospitalist Progress Note     Philip Castellano Patient Status:  Inpatient    1985 MRN [id number]   Location [facility] 3NE-A Attending Guero Sepulveda,    Hosp Day # 1 PCP Tish Guzman     Chief Complaint: seizure activity    Subjective:     Patient denies any sob or cp. Working w/ PT.     Objective:    Review of Systems:   A comprehensive review of systems was completed; pertinent positive and negatives stated in subjective.    Vital signs:  Temp:  [97.4 °F (36.3 °C)-100 °F (37.8 °C)] 99.6 °F (37.6 °C)  Pulse:  [] 109  Resp:  [15-22] 20  BP: (102-160)/() 135/103  SpO2:  [96 %-100 %] 96 %    Physical Exam:    General: No acute distress  Respiratory: No wheezes, no rhonchi  Cardiovascular: S1, S2, regular rate and rhythm  Abdomen: Soft, Non-tender, non-distended, positive bowel sounds  Neuro: No new focal deficits.   Extremities: No edema      Diagnostic Data:    Labs:  Recent Labs   Lab 24  1443 24  0555   WBC 6.7 5.9   HGB 13.2 11.0*   MCV 98.0 95.5   PLT 96.0* 60.0*       Recent Labs   Lab 24  1443 24  0555   * 74   BUN 5* 4*   CREATSERUM 1.06 0.64*   CA 8.7 7.7*   ALB 4.6 3.9    134*   K 3.7 2.5*    95*   CO2 14.0* 30.0   ALKPHO 72 57   * 69*   ALT 41 30   BILT 1.1 1.8   TP 7.9 6.7       CrCl cannot be calculated (Unknown ideal weight.).    No results for input(s): \"TROP\", \"TROPHS\", \"CK\" in the last 168 hours.    No results for input(s): \"PTP\", \"INR\" in the last 168 hours.               Microbiology    No results found for this visit on 24.      Imaging: Reviewed in Epic.    Medications:    potassium chloride  40 mEq Oral Q4H    thiamine  100 mg Intravenous Daily    folic acid  1 mg Intravenous Daily    multivitamin  1 tablet Oral Daily       Assessment & Plan:      #Seizures  -suspect ETOH withdrawal seizures  -given keppra in ER  -EEG  -neuro to see  -CT brain negative      #Alcohol  withdrawals  #Delirium tremons  -CIWA protocol w/ prn benzos  -MVI, thiamine, folic acid     #Hyponatremia  -trend labs, cont ivf    #Elevated AST and TCP  -due to ETOH abuse  -trend     #Facial / nose laceration and Partial hematoma         Guero Sepulveda,     Supplementary Documentation:     Quality:  DVT Mechanical Prophylaxis:   SCDs,    DVT Pharmacologic Prophylaxis   Medication   None                Code Status: Not on file  Stanley: No urinary catheter in place  Stanley Duration (in days):   Central line:    JAZIEL:     Discharge is dependent on: clinical improvement  At this point Mr. Castellano is expected to be discharge to: tbd    The 21st Century Cures Act makes medical notes like these available to patients in the interest of transparency. Please be advised this is a medical document. Medical documents are intended to carry relevant information, facts as evident, and the clinical opinion of the practitioner. The medical note is intended as peer to peer communication and may appear blunt or direct. It is written in medical language and may contain abbreviations or verbiage that are unfamiliar.

## 2024-03-12 NOTE — DISCHARGE INSTRUCTIONS
Psychiatrist In-Network with Medicaid (Accepting New Patients)    Counseling In-Network with Medicaid     [facility], [facility]  [address] [place] [address]  [phone]    [facility], [facility]  [address] [place] [address]  [phone]    [facility]  [address], [place] [address] [phone]    [facility]  [address]  [place], [place] [address] [phone]    [facility]  [address], [place], [place] [id number] [phone]    [facility]  ** Therapy and Psychiatry, multiple locations  [phone]          Cary Sepulveda MD  [facility]   [address], [place], [place] [address] [phone]    Loan Dewitt MD  [facility]  [address]  [place], [place] [address] [phone]    Baltazar Glass MD  [facility]  [address], [place] [address]  [phone]    MD Zac Shaw III, MD  [facility]   [address], [place], [place] [address] [phone]    Hiram Mazariegos MD  [facility]  [address], [place], [place] [address] [phone]    Kena Conrad MD  [facility]  [address] [place], [place] [id number] [phone]    Patient will be transported to the [facility] residential   Program on date of discharge.

## 2024-03-12 NOTE — PHYSICAL THERAPY NOTE
PHYSICAL THERAPY EVALUATION - INPATIENT     Room Number: 3623/3623-A  Evaluation Date: 3/12/2024  Type of Evaluation: Initial  Physician Order: PT Eval and Treat    Presenting Problem: Fall, AMS, seizures, ETOH withdrawal, R scalp hematoma  Co-Morbidities : ETOH abuse  Reason for Therapy: Mobility Dysfunction and Discharge Planning    PHYSICAL THERAPY ASSESSMENT   Patient is currently functioning below baseline with bed mobility, transfers, gait, stair negotiation, maintaining seated position, standing prolonged periods, and performing household tasks.  Prior to admission, patient's baseline is independent.  Patient is requiring contact guard assist and minimal assist as a result of the following impairments: decreased endurance/aerobic capacity, impaired sitting and standing balance, impaired coordination, impaired motor planning, cognitive deficits (decreased safety awareness), medical status, and alcohol withdrawal .  Physical Therapy will continue to follow for duration of hospitalization.    Patient will benefit from continued skilled PT Services For duration of hospitalization, however, given the patient is functioning near baseline level do not anticipate skilled therapy needs at discharge . Anticipate as medical picture and detox symptoms improve, pt functionally will improve as well.     PLAN  PT Treatment Plan: Bed mobility;Body mechanics;Energy conservation;Endurance;Patient education;Family education;Gait training;Strengthening;Balance training;Transfer training  Rehab Potential : Fair  Frequency (Obs): 3x/week  Number of Visits to Meet Established Goals: 5      CURRENT GOALS    Goal #1 Patient is able to demonstrate supine - sit EOB @ level: independent     Goal #2 Patient is able to demonstrate transfers EOB to/from Chair/Wheelchair at assistance level: independent     Goal #3 Patient is able to ambulate 150 feet with assist device:  LRAD  at assistance level: supervision     Goal #4    Goal #5     Goal #6    Goal Comments: Goals established on 3/12/2024      PHYSICAL THERAPY MEDICAL/SOCIAL HISTORY  History related to current admission: Patient is a 38 year old male admitted on 3/11/2024 from home for fall, scalp contusion.  Pt diagnosed with seizure, ETOH withdrawal.      HOME SITUATION  Type of Home: Condo    Elevator                Lives With: Parent(s);Other (Comment) (mom)  Drives: Yes  Patient Owned Equipment: Rolling walker       Prior Level of [place]: Pt reports he is typically independent with ADLs and mobility. Not currently working.     SUBJECTIVE  \"I\"m tired\"       OBJECTIVE  Precautions: Bed/chair alarm  Fall Risk: High fall risk    WEIGHT BEARING RESTRICTION  Weight Bearing Restriction: None                PAIN ASSESSMENT  Ratin          COGNITION  Overall Cognitive Status:  Impaired  Safety Judgement:  decreased awareness of need for assistance and decreased awareness of need for safety  Awareness of Errors:  decreased awareness of errors   Awareness of Deficits:  decreased awareness of deficits  Problem Solving:  assistance required to identify errors made, assistance required to generate solutions, and assistance required to implement solutions    RANGE OF MOTION AND STRENGTH ASSESSMENT  See OT note for UE assessment    Lower extremity ROM is within functional limits    Lower extremity strength is within functional limits       BALANCE  Static Sitting: Poor +  Dynamic Sitting: Poor +  Static Standing: Poor +  Dynamic Standing: Poor +    ADDITIONAL TESTS                                    ACTIVITY TOLERANCE  Pulse: (!) 155  Heart Rate Source: Monitor                   O2 WALK       NEUROLOGICAL FINDINGS  Neurological Findings: Coordination - Heel to Shin;Coordination - Rapid Alternating Movement     Coordination - Heel to Shin: Symmetrical  Coordination - Rapid Alternating Movement: Symmetrical            AM-PAC '6-Clicks' INPATIENT SHORT FORM - BASIC MOBILITY  How much difficulty  does the patient currently have...  Patient Difficulty: Turning over in bed (including adjusting bedclothes, sheets and blankets)?: A Little   Patient Difficulty: Sitting down on and standing up from a chair with arms (e.g., wheelchair, bedside commode, etc.): A Little   Patient Difficulty: Moving from lying on back to sitting on the side of the bed?: A Little   How much help from another person does the patient currently need...   Help from Another: Moving to and from a bed to a chair (including a wheelchair)?: A Little   Help from Another: Need to walk in hospital room?: A Lot   Help from Another: Climbing 3-5 steps with a railing?: A Lot       AM-PAC Score:  Raw Score: 16   Approx Degree of Impairment: 54.16%   Standardized Score (AM-PAC Scale): 40.78   CMS Modifier (G-Code): CK    FUNCTIONAL ABILITY STATUS  Gait Assessment   Functional Mobility/Gait Assessment  Gait Assistance: Not tested  Distance (ft): 0    Skilled Therapy Provided     Therapeutic Activity:   Pt cued on scooting anteriorly towards EOB for improved static sitting balance with support of feet on floor  Pt with poor safety awareness and poor awareness of deficits, attempting to stand without prompting and without gait belt  Pt cued on placement of UE and LEs for optimal force generation and safe STS transfer  In standing, pt able to adjust brief with CGA-Mabel for dynamic balance. HR to 150s, returned to sitting and then to supine with supervision       Bed Mobility:  Rolling: NT  Supine to sit: supervision   Sit to supine: supervision     Transfer Mobility:  Sit to stand: CGA   Stand to sit: CGA  Gait = deferred due to HR in 150s with st<>stand     Therapist's Comments: RN cleared for session. Pt agreeable for therapy, received supine. HR at rest in low 100s-110s. HR max 150s with prolonged standing and adjusting brief. Deferred further mobility. Notified RN. Instructed to call for nursing staff for any needs and OOB mobility.      Exercise/Education Provided:  Bed mobility  Body mechanics  Energy conservation  Functional activity tolerated  Posture  Strengthening  Transfer training    Patient End of Session: In bed;Needs met;Call light within reach;RN aware of session/findings;Alarm set;All patient questions and concerns addressed      Patient Evaluation Complexity Level:  History High - 3 or more personal factors and/or co-morbidities   Examination of body systems Moderate - addressing a total of 3 or more elements   Clinical Presentation Moderate - Evolving   Clinical Decision Making Moderate - Evolving       PT Session Time: 20 minutes  Therapeutic Activity: 10 minutes

## 2024-03-12 NOTE — OCCUPATIONAL THERAPY NOTE
OCCUPATIONAL THERAPY EVALUATION - INPATIENT     Room Number: 3623/3623-A  Evaluation Date: 3/12/2024  Type of Evaluation: Initial  Presenting Problem: alcohol withdrawal, seizure    Physician Order: IP Consult to Occupational Therapy  Reason for Therapy: ADL/IADL Dysfunction and Discharge Planning    OCCUPATIONAL THERAPY ASSESSMENT   Patient is currently functioning below baseline with toileting, bathing, upper body dressing, lower body dressing, bed mobility, transfers, stating sitting balance, dynamic sitting balance, and static standing balance. Prior to admission, patient's baseline is independent.  Patient is requiring minimal assist and moderate assist as a result of the following impairments: decreased endurance, impaired sitting and standing balance, cognitive deficits (impaired concentration and attention), medical status, and decreased insight to deficits. Occupational Therapy will continue to follow for duration of hospitalization.    Patient will benefit from continued skilled OT Services For duration of hospitalization, however, given the patient is functioning near baseline level do not anticipate skilled therapy needs at discharge       History Related to Current Admission: Patient is a 38 year old male admitted on 3/11/2024 with Presenting Problem: alcohol withdrawal, seizure. Co-Morbidities : ETOH abuse    WEIGHT BEARING RESTRICTION  Weight Bearing Restriction: None                Recommendations for nursing staff:   Transfers: TBA  Toileting location: bed level until HR more stable    EVALUATION SESSION:  Patient Start of Session: supine  FUNCTIONAL TRANSFER ASSESSMENT  Sit to Stand: Edge of Bed  Edge of Bed: Contact Guard Assist    BED MOBILITY  Supine to Sit : Stand-by Assist  Sit to Supine (OT): Stand-by Assist    BALANCE ASSESSMENT  Static Sitting: Stand-by Assist  Sitting Bilateral: Contact Guard Assist  Static Standing: Stand-by Assist    FUNCTIONAL ADL ASSESSMENT       ACTIVITY TOLERANCE: HR  on 120 and 130s while seated at edge of be, up to 150 in standing while attempting to pull up brief  Pulse: (!) 136  Heart Rate Source: Monitor                   O2 SATURATIONS       COGNITION  Arousal/Alertness:  appropriate responses to stimuli  Attention Span:  difficulty attending to directions and difficulty dividing attention  Orientation Level:  oriented x4  Following Commands:  follows one step commands with increased time  Awareness of Errors:  decreased awareness of errors   Awareness of Deficits:  decreased awareness of deficits  Not formally tested    Upper Extremity   ROM: within functional limits   Strength: within functional limits   Coordination  Gross motor: wfl  Fine motor: wfl   Sensation: no evidence of impairment    EDUCATION PROVIDED  Patient: Role of Occupational Therapy; Plan of Care  Patient's Response to Education: Requires Further Education; Verbalized Understanding    Equipment used: none  Demonstrates functional use, Would benefit from additional trial yes     Therapist comments: Patient demonstrated impaired sitting balance and awareness of errors. He needed repeated cueing for impulsively attempting to stand up x2. Patient had increased HR to 150s in standing as was cued to lie back down.     Patient End of Session: All patient questions and concerns addressed;RN aware of session/findings;Call light within reach;Needs met;In bed;Alarm set    OCCUPATIONAL PROFILE    HOME SITUATION  Type of Home: Condo  Home Layout: Elevator;Other (Comment) (2nd floor condo, can use stairs or elevator)  Lives With: Parent(s);Other (Comment) (mom)    Toilet and Equipment: Standard height toilet          Occupation/Status: not working     Drives: Yes       Prior Level of Function: Independent without devices. Lives with his mother in a condo. Does not currently work. He drives and completes his own IADLs, (laundry, cooking).     SUBJECTIVE   Cooperative, sleepy , distractible    PAIN ASSESSMENT  Rating:  0          OBJECTIVE  Precautions: Bed/chair alarm;Seizure (CIWA)  Fall Risk: High fall risk      ASSESSMENTS    AM-PAC ‘6-Clicks’ Inpatient Daily Activity Short Form  -   Putting on and taking off regular lower body clothing?: A Lot  -   Bathing (including washing, rinsing, drying)?: A Lot  -   Toileting, which includes using toilet, bedpan or urinal? : A Lot  -   Putting on and taking off regular upper body clothing?: A Little  -   Taking care of personal grooming such as brushing teeth?: None  -   Eating meals?: None    AM-PAC Score:  Score: 17  Approx Degree of Impairment: 50.11%  Standardized Score (AM-PAC Scale): 37.26    ADDITIONAL TESTS     NEUROLOGICAL FINDINGS      COGNITION ASSESSMENTS       PLAN  OT Treatment Plan: Cognitive reorientation;Functional transfer training;ADL training;Energy conservation/work simplification techniques;Balance activities;UE strengthening/ROM;Endurance training;Continued evaluation;Compensatory technique education;Patient/Family training;Patient/Family education;Equipment eval/education  Rehab Potential : Good  Frequency: 3-5x/week  Number of Visits to Meet Established Goals: 4    ADL Goals   Patient will perform grooming: with supervision and while standing at sink  Patient will perform lower body dressing:  with supervision  Patient will perform toileting: with supervision    Functional Transfer Goals  Patient will transfer to toilet:  with supervision      Patient Evaluation Complexity Level:   Occupational Profile/Medical History LOW - Brief history including review of medical or therapy records    Specific performance deficits impacting engagement in ADL/IADL LOW  1 - 3 performance deficits    Client Assessment/Performance Deficits MODERATE - Comorbidities and min to mod modifications of tasks    Clinical Decision Making MODERATE - Analysis of occupational profile, detailed assessments, several treatment options    Overall Complexity LOW     OT Session Time: 8  minutes

## 2024-03-12 NOTE — PROCEDURES
EEG REPORT;    Reason for Examination: Encephalopathy/fall    Technical Summary:   18 Channels of EEG and 1 Channel of EKG was performed utilizing internation 10/20 method.        Background Activity:   The background activity consisted of 8 hz waveforms, reactive to eye opening/ external stimulation.    Abnormality:  Throughout the recording low to medium voltage, polymorphic, 2 to 5 Hz slow activity was noted diffusely over both hemispheres mixed with beta activity.      Activation:    Hyperventilation:   Not Performed.    Photic Stimulation:  Driving response seen.No       Sleep:  Stage I sleep seen.       Impression:  This is a Abnormal EEG.  Moderate diffuse slowing into delta and theta range was noted.  This constellation of findings can be seen in encephalopathy due to metabolic/toxic etiology, medication effects or diffuse cerebral injury.  Intermittent beta activity was also noted, most likely due to medication effect.  No focal, lateralized or generalized epileptiform activity seen. Clinical correlation is recommended.        Raj Cody MD  [facility].

## 2024-03-13 ENCOUNTER — APPOINTMENT (OUTPATIENT)
Dept: CT IMAGING | Facility: HOSPITAL | Age: 39
End: 2024-03-13
Payer: MEDICAID

## 2024-03-13 ENCOUNTER — ANESTHESIA EVENT (OUTPATIENT)
Dept: MEDSURG UNIT | Facility: HOSPITAL | Age: 39
End: 2024-03-13
Payer: MEDICAID

## 2024-03-13 ENCOUNTER — ANESTHESIA (OUTPATIENT)
Dept: MEDSURG UNIT | Facility: HOSPITAL | Age: 39
End: 2024-03-13
Payer: MEDICAID

## 2024-03-13 ENCOUNTER — APPOINTMENT (OUTPATIENT)
Dept: GENERAL RADIOLOGY | Facility: HOSPITAL | Age: 39
End: 2024-03-13
Payer: MEDICAID

## 2024-03-13 LAB
ALBUMIN SERPL-MCNC: 3.4 G/DL (ref 3.4–5)
ALBUMIN/GLOB SERPL: 1.4 {RATIO} (ref 1–2)
ALP LIVER SERPL-CCNC: 47 U/L
ALT SERPL-CCNC: 31 U/L
ANION GAP SERPL CALC-SCNC: 16 MMOL/L (ref 0–18)
ARTERIAL PATENCY WRIST A: POSITIVE
ARTERIAL PATENCY WRIST A: POSITIVE
AST SERPL-CCNC: 86 U/L (ref 15–37)
ATRIAL RATE: 118 BPM
BASE EXCESS BLDA CALC-SCNC: -0.6 MMOL/L (ref ?–2)
BASE EXCESS BLDA CALC-SCNC: 0 MMOL/L (ref ?–2)
BASOPHILS # BLD AUTO: 0.03 X10(3) UL (ref 0–0.2)
BASOPHILS NFR BLD AUTO: 0.6 %
BILIRUB SERPL-MCNC: 1.4 MG/DL (ref 0.1–2)
BILIRUB UR QL STRIP.AUTO: NEGATIVE
BODY TEMPERATURE: 98.6 F
BODY TEMPERATURE: 98.6 F
BUN BLD-MCNC: 5 MG/DL (ref 9–23)
CALCIUM BLD-MCNC: 7.7 MG/DL (ref 8.5–10.1)
CHLORIDE SERPL-SCNC: 102 MMOL/L (ref 98–112)
CK SERPL-CCNC: 768 U/L
CLARITY UR REFRACT.AUTO: CLEAR
CO2 SERPL-SCNC: 21 MMOL/L (ref 21–32)
COHGB MFR BLD: 1.7 % SAT (ref 0–3)
COHGB MFR BLD: 2.3 % SAT (ref 0–3)
CREAT BLD-MCNC: 0.39 MG/DL
D DIMER PPP FEU-MCNC: 1.67 UG/ML FEU (ref ?–0.5)
EGFRCR SERPLBLD CKD-EPI 2021: 143 ML/MIN/1.73M2 (ref 60–?)
EOSINOPHIL # BLD AUTO: 0.02 X10(3) UL (ref 0–0.7)
EOSINOPHIL NFR BLD AUTO: 0.4 %
ERYTHROCYTE [DISTWIDTH] IN BLOOD BY AUTOMATED COUNT: 12.9 %
FIO2: 100 %
GLOBULIN PLAS-MCNC: 2.4 G/DL (ref 2.8–4.4)
GLUCOSE BLD-MCNC: 128 MG/DL (ref 70–99)
GLUCOSE BLD-MCNC: 129 MG/DL (ref 70–99)
GLUCOSE BLD-MCNC: 194 MG/DL (ref 70–99)
GLUCOSE BLD-MCNC: 64 MG/DL (ref 70–99)
GLUCOSE BLD-MCNC: 69 MG/DL (ref 70–99)
GLUCOSE BLD-MCNC: 96 MG/DL (ref 70–99)
GLUCOSE UR STRIP.AUTO-MCNC: NORMAL MG/DL
HCO3 BLDA-SCNC: 24.4 MEQ/L (ref 21–27)
HCO3 BLDA-SCNC: 24.9 MEQ/L (ref 21–27)
HCT VFR BLD AUTO: 31.8 %
HGB BLD-MCNC: 10.8 G/DL
HGB BLD-MCNC: 11.5 G/DL
HGB BLD-MCNC: 11.7 G/DL
IMM GRANULOCYTES # BLD AUTO: 0.1 X10(3) UL (ref 0–1)
IMM GRANULOCYTES NFR BLD: 2 %
KETONES UR STRIP.AUTO-MCNC: 60 MG/DL
L/M: 2 L/MIN
LEUKOCYTE ESTERASE UR QL STRIP.AUTO: NEGATIVE
LYMPHOCYTES # BLD AUTO: 0.74 X10(3) UL (ref 1–4)
LYMPHOCYTES NFR BLD AUTO: 14.8 %
MAGNESIUM SERPL-MCNC: 1.3 MG/DL (ref 1.6–2.6)
MAGNESIUM SERPL-MCNC: 1.4 MG/DL (ref 1.6–2.6)
MCH RBC QN AUTO: 33 PG (ref 26–34)
MCHC RBC AUTO-ENTMCNC: 34 G/DL (ref 31–37)
MCV RBC AUTO: 97.2 FL
METHGB MFR BLD: 0.6 % SAT (ref 0.4–1.5)
METHGB MFR BLD: 1.2 % SAT (ref 0.4–1.5)
MONOCYTES # BLD AUTO: 0.28 X10(3) UL (ref 0.1–1)
MONOCYTES NFR BLD AUTO: 5.6 %
NEUTROPHILS # BLD AUTO: 3.84 X10 (3) UL (ref 1.5–7.7)
NEUTROPHILS # BLD AUTO: 3.84 X10(3) UL (ref 1.5–7.7)
NEUTROPHILS NFR BLD AUTO: 76.6 %
NITRITE UR QL STRIP.AUTO: NEGATIVE
OSMOLALITY SERPL CALC.SUM OF ELEC: 284 MOSM/KG (ref 275–295)
OXYHGB MFR BLDA: 94.7 % (ref 92–100)
OXYHGB MFR BLDA: 97.1 % (ref 92–100)
P AXIS: 66 DEGREES
P-R INTERVAL: 116 MS
PCO2 BLDA: 34 MM HG (ref 35–45)
PCO2 BLDA: 34 MM HG (ref 35–45)
PEEP: 5 CM H2O
PH BLDA: 7.44 [PH] (ref 7.35–7.45)
PH BLDA: 7.45 [PH] (ref 7.35–7.45)
PH UR STRIP.AUTO: 6 [PH] (ref 5–8)
PHOSPHATE SERPL-MCNC: 0.7 MG/DL (ref 2.5–4.9)
PLATELET # BLD AUTO: 63 10(3)UL (ref 150–450)
PO2 BLDA: 121 MM HG (ref 80–100)
PO2 BLDA: 75 MM HG (ref 80–100)
POTASSIUM SERPL-SCNC: 2.8 MMOL/L (ref 3.5–5.1)
POTASSIUM SERPL-SCNC: 3 MMOL/L (ref 3.5–5.1)
POTASSIUM SERPL-SCNC: 3.4 MMOL/L (ref 3.5–5.1)
PROCALCITONIN SERPL-MCNC: 1.86 NG/ML (ref ?–0.16)
PROT SERPL-MCNC: 5.8 G/DL (ref 6.4–8.2)
PROT UR STRIP.AUTO-MCNC: 30 MG/DL
Q-T INTERVAL: 360 MS
Q-T INTERVAL: 392 MS
QRS DURATION: 88 MS
QRS DURATION: 90 MS
QTC CALCULATION (BEZET): 504 MS
QTC CALCULATION (BEZET): 535 MS
R AXIS: 82 DEGREES
R AXIS: 88 DEGREES
RBC # BLD AUTO: 3.27 X10(6)UL
SODIUM SERPL-SCNC: 139 MMOL/L (ref 136–145)
SP GR UR STRIP.AUTO: 1.02 (ref 1–1.03)
T AXIS: 66 DEGREES
T AXIS: 76 DEGREES
TIDAL VOLUME: 500 ML
TRIGL SERPL-MCNC: 167 MG/DL (ref 30–149)
UROBILINOGEN UR STRIP.AUTO-MCNC: NORMAL MG/DL
VENT RATE: 14 /MIN
VENTRICULAR RATE: 112 BPM
VENTRICULAR RATE: 118 BPM
WBC # BLD AUTO: 5 X10(3) UL (ref 4–11)

## 2024-03-13 PROCEDURE — 5A1945Z RESPIRATORY VENTILATION, 24-96 CONSECUTIVE HOURS: ICD-10-PCS | Performed by: ANESTHESIOLOGY

## 2024-03-13 PROCEDURE — 99291 CRITICAL CARE FIRST HOUR: CPT | Performed by: INTERNAL MEDICINE

## 2024-03-13 PROCEDURE — 0BH17EZ INSERTION OF ENDOTRACHEAL AIRWAY INTO TRACHEA, VIA NATURAL OR ARTIFICIAL OPENING: ICD-10-PCS | Performed by: ANESTHESIOLOGY

## 2024-03-13 PROCEDURE — 99233 SBSQ HOSP IP/OBS HIGH 50: CPT | Performed by: HOSPITALIST

## 2024-03-13 PROCEDURE — 71275 CT ANGIOGRAPHY CHEST: CPT

## 2024-03-13 PROCEDURE — 70450 CT HEAD/BRAIN W/O DYE: CPT

## 2024-03-13 PROCEDURE — 71045 X-RAY EXAM CHEST 1 VIEW: CPT

## 2024-03-13 RX ORDER — SENNOSIDES 8.8 MG/5ML
10 LIQUID ORAL NIGHTLY PRN
Status: DISCONTINUED | OUTPATIENT
Start: 2024-03-13 | End: 2024-03-17

## 2024-03-13 RX ORDER — MIDAZOLAM HYDROCHLORIDE 1 MG/ML
INJECTION INTRAMUSCULAR; INTRAVENOUS
Status: DISCONTINUED
Start: 2024-03-13 | End: 2024-03-13 | Stop reason: WASHOUT

## 2024-03-13 RX ORDER — LORAZEPAM 2 MG/ML
1 INJECTION INTRAMUSCULAR EVERY 8 HOURS
Status: DISCONTINUED | OUTPATIENT
Start: 2024-03-15 | End: 2024-03-13

## 2024-03-13 RX ORDER — LORAZEPAM 2 MG/ML
1 INJECTION INTRAMUSCULAR EVERY 12 HOURS
Status: DISCONTINUED | OUTPATIENT
Start: 2024-03-16 | End: 2024-03-13

## 2024-03-13 RX ORDER — MELATONIN
100 DAILY
Status: DISCONTINUED | OUTPATIENT
Start: 2024-03-14 | End: 2024-03-19

## 2024-03-13 RX ORDER — LORAZEPAM 2 MG/ML
2 INJECTION INTRAMUSCULAR EVERY 6 HOURS
Status: DISCONTINUED | OUTPATIENT
Start: 2024-03-13 | End: 2024-03-13

## 2024-03-13 RX ORDER — ACETAMINOPHEN 325 MG/1
650 TABLET ORAL EVERY 4 HOURS PRN
Status: DISCONTINUED | OUTPATIENT
Start: 2024-03-13 | End: 2024-03-17

## 2024-03-13 RX ORDER — ENEMA 19; 7 G/133ML; G/133ML
1 ENEMA RECTAL ONCE AS NEEDED
Status: DISCONTINUED | OUTPATIENT
Start: 2024-03-13 | End: 2024-03-17

## 2024-03-13 RX ORDER — POLYETHYLENE GLYCOL 3350 17 G/17G
17 POWDER, FOR SOLUTION ORAL DAILY PRN
Status: DISCONTINUED | OUTPATIENT
Start: 2024-03-13 | End: 2024-03-17

## 2024-03-13 RX ORDER — ACETAMINOPHEN 650 MG/1
650 SUPPOSITORY RECTAL EVERY 4 HOURS PRN
Status: DISCONTINUED | OUTPATIENT
Start: 2024-03-13 | End: 2024-03-17

## 2024-03-13 RX ORDER — ACETAMINOPHEN 10 MG/ML
1000 INJECTION, SOLUTION INTRAVENOUS EVERY 6 HOURS PRN
Status: DISCONTINUED | OUTPATIENT
Start: 2024-03-13 | End: 2024-03-17

## 2024-03-13 RX ORDER — SODIUM BICARBONATE 325 MG/1
325 TABLET ORAL AS NEEDED
Status: DISCONTINUED | OUTPATIENT
Start: 2024-03-13 | End: 2024-03-16

## 2024-03-13 RX ORDER — DEXTROSE MONOHYDRATE 25 G/50ML
50 INJECTION, SOLUTION INTRAVENOUS
Status: DISCONTINUED | OUTPATIENT
Start: 2024-03-13 | End: 2024-03-19

## 2024-03-13 RX ORDER — CHLORHEXIDINE GLUCONATE ORAL RINSE 1.2 MG/ML
15 SOLUTION DENTAL
Status: DISCONTINUED | OUTPATIENT
Start: 2024-03-13 | End: 2024-03-15 | Stop reason: ALTCHOICE

## 2024-03-13 RX ORDER — POTASSIUM CHLORIDE 1.5 G/1.58G
40 POWDER, FOR SOLUTION ORAL EVERY 4 HOURS
Status: COMPLETED | OUTPATIENT
Start: 2024-03-13 | End: 2024-03-13

## 2024-03-13 RX ORDER — BISACODYL 10 MG
10 SUPPOSITORY, RECTAL RECTAL
Status: DISCONTINUED | OUTPATIENT
Start: 2024-03-13 | End: 2024-03-17

## 2024-03-13 RX ORDER — DEXTROSE MONOHYDRATE 25 G/50ML
INJECTION, SOLUTION INTRAVENOUS
Status: COMPLETED
Start: 2024-03-13 | End: 2024-03-13

## 2024-03-13 RX ORDER — NICOTINE POLACRILEX 4 MG
30 LOZENGE BUCCAL
Status: DISCONTINUED | OUTPATIENT
Start: 2024-03-13 | End: 2024-03-19

## 2024-03-13 RX ORDER — MIDAZOLAM HYDROCHLORIDE 1 MG/ML
2 INJECTION INTRAMUSCULAR; INTRAVENOUS ONCE
Status: COMPLETED | OUTPATIENT
Start: 2024-03-13 | End: 2024-03-13

## 2024-03-13 RX ORDER — LORAZEPAM 2 MG/ML
1 INJECTION INTRAMUSCULAR EVERY 6 HOURS
Status: DISCONTINUED | OUTPATIENT
Start: 2024-03-14 | End: 2024-03-13

## 2024-03-13 RX ORDER — ALBUMIN, HUMAN INJ 5% 5 %
12.5 SOLUTION INTRAVENOUS ONCE
Status: COMPLETED | OUTPATIENT
Start: 2024-03-13 | End: 2024-03-13

## 2024-03-13 RX ORDER — NICOTINE POLACRILEX 4 MG
15 LOZENGE BUCCAL
Status: DISCONTINUED | OUTPATIENT
Start: 2024-03-13 | End: 2024-03-19

## 2024-03-13 RX ORDER — VANCOMYCIN HYDROCHLORIDE 50 MG/ML
125 KIT ORAL 4 TIMES DAILY
Status: DISCONTINUED | OUTPATIENT
Start: 2024-03-13 | End: 2024-03-16

## 2024-03-13 RX ORDER — DEXTROSE AND SODIUM CHLORIDE 5; .9 G/100ML; G/100ML
INJECTION, SOLUTION INTRAVENOUS CONTINUOUS
Status: DISCONTINUED | OUTPATIENT
Start: 2024-03-13 | End: 2024-03-14

## 2024-03-13 RX ORDER — ACETAMINOPHEN 160 MG/5ML
650 SOLUTION ORAL EVERY 4 HOURS PRN
Status: DISCONTINUED | OUTPATIENT
Start: 2024-03-13 | End: 2024-03-17

## 2024-03-13 RX ORDER — ALBUMIN, HUMAN INJ 5% 5 %
SOLUTION INTRAVENOUS
Status: COMPLETED
Start: 2024-03-13 | End: 2024-03-13

## 2024-03-13 RX ORDER — FOLIC ACID 1 MG/1
1 TABLET ORAL DAILY
Status: DISCONTINUED | OUTPATIENT
Start: 2024-03-14 | End: 2024-03-19

## 2024-03-13 NOTE — PLAN OF CARE
Assumed pt care at 1930  Aox2-3, RA, VSS  Tele-ST  WA protocol  Seizure and aspiration precautions  Safety precautions in place  4 pt soft restraints with 4 side rails up  Incontinent-bowel and urine  Noncardiac electrolyte protocol  Bed in lowest position  Call light within reach  Updated with plan of care  All needs met at this time      RRT called due to pt's ETOH withdrawal symptoms. Transferred to ICU. Family made aware    Problem: Patient/Family Goals  Goal: Patient/Family Long Term Goal  Description: Patient's Long Term Goal: abstain from alcohol    Interventions:  - Psych to see  -[facility] protocol  -medicines  -comply with POC  - follow up care  - See additional Care Plan goals for specific interventions  Outcome: Progressing  Goal: Patient/Family Short Term Goal  Description: Patient's Short Term Goal: be comfortable    Interventions:   - Ativan PRN  -Pain med  -needs attended  - See additional Care Plan goals for specific interventions  Outcome: Progressing     Problem: PAIN - ADULT  Goal: Verbalizes/displays adequate comfort level or patient's stated pain goal  Description: INTERVENTIONS:  - Encourage pt to monitor pain and request assistance  - Assess pain using appropriate pain scale  - Administer analgesics based on type and severity of pain and evaluate response  - Implement non-pharmacological measures as appropriate and evaluate response  - Consider cultural and social influences on pain and pain management  - Manage/alleviate anxiety  - Utilize distraction and/or relaxation techniques  - Monitor for opioid side effects  - Notify MD/LIP if interventions unsuccessful or patient reports new pain  - Anticipate increased pain with activity and pre-medicate as appropriate  Outcome: Progressing     Problem: SAFETY ADULT - FALL  Goal: Free from fall injury  Description: INTERVENTIONS:  - Assess pt frequently for physical needs  - Identify cognitive and physical deficits and behaviors that affect risk of  falls.  - Gilbert fall precautions as indicated by assessment.  - Educate pt/family on patient safety including physical limitations  - Instruct pt to call for assistance with activity based on assessment  - Modify environment to reduce risk of injury  - Provide assistive devices as appropriate  - Consider OT/PT consult to assist with strengthening/mobility  - Encourage toileting schedule  Outcome: Progressing     Problem: DISCHARGE PLANNING  Goal: Discharge to home or other facility with appropriate resources  Description: INTERVENTIONS:  - Identify barriers to discharge w/pt and caregiver  - Include patient/family/discharge partner in discharge planning  - Arrange for needed discharge resources and transportation as appropriate  - Identify discharge learning needs (meds, wound care, etc)  - Arrange for interpreters to assist at discharge as needed  - Consider post-discharge preferences of patient/family/discharge partner  - Complete POLST form as appropriate  - Assess patient's ability to be responsible for managing their own health  - Refer to Case Management Department for coordinating discharge planning if the patient needs post-hospital services based on physician/LIP order or complex needs related to functional status, cognitive ability or social support system  Outcome: Progressing     Problem: SKIN/TISSUE INTEGRITY - ADULT  Goal: Incision(s), wounds(s) or drain site(s) healing without S/S of infection  Description: INTERVENTIONS:  - Assess and document risk factors for pressure ulcer development  - Assess and document skin integrity  - Assess and document dressing/incision, wound bed, drain sites and surrounding tissue  - Implement wound care per orders  - Initiate isolation precautions as appropriate  - Initiate Pressure Ulcer prevention bundle as indicated  Outcome: Progressing     Problem: NEUROLOGICAL - ADULT  Goal: Absence of seizures  Description: INTERVENTIONS  - Monitor for seizure activity  -  Administer anti-seizure medications as ordered  - Monitor neurological status  Outcome: Progressing  Goal: Achieves maximal functionality and self care  Description: INTERVENTIONS  - Monitor swallowing and airway patency with patient fatigue and changes in neurological status  - Encourage and assist patient to increase activity and self care with guidance from PT/OT  - Encourage visually impaired, hearing impaired and aphasic patients to use assistive/communication devices  Outcome: Progressing     Problem: Safety Risk - Non-Violent Restraints  Goal: Patient will remain free from self-harm  Description: INTERVENTIONS:  - Apply the least restrictive restraint to prevent harm  - Notify patient and family of reasons restraints applied  - Assess for any contributing factors to confusion (electrolyte disturbances, delirium, medications)  - Discontinue any unnecessary medical devices as soon as possible  - Assess the patient's physical comfort, circulation, skin condition, hydration, nutrition and elimination needs   - Reorient and redirection as needed  - Assess for the need to continue restraints  Outcome: Progressing     Problem: Delirium  Goal: Minimize duration of delirium  Description: Interventions:  - Encourage use of hearing aids, eye glasses  - Promote highest level of mobility daily  - Provide frequent reorientation  - Promote wakefulness i.e. lights on, blinds open  - Promote sleep, encourage patient's normal rest cycle i.e. lights off, TV off, minimize noise and interruptions  - Encourage family to assist in orientation and promotion of home routines  Outcome: Progressing

## 2024-03-13 NOTE — PROGRESS NOTES
03/13/24 1235   Vent Information   $ RT Standby Charge (per 15 min) 1   Vent Initiation Date 03/13/24   Ventilation Day(s) 1   Interface Invasive   Vent Type    Vent plugged into main power? Yes   Vent ID [id number]   Vent Mode VC+   Settings   FiO2 (%) 40 %   Resp Rate (Set) 14   Vt (Set, mL) 500 mL   Waveform Decelerating ramp   PEEP/CPAP (cm H2O) 5 cm H20   Insp Time (sec) 0.9 sec   Insp Rise Time (%) 50 %   Trigger Sensitivity Flow (L/min) 3 L/min   Humidification Heater   H2O Bag Level 3/4 Full   Heater Temperature 98.6 °F (37 °C)   Readings   Total RR 14   Minute Ventilation (L/min) 6.9 L/min   Inspiratory Tidal Volume 500 mL   Expiratory Tidal Volume 497 mL   PIP Observed (cm H2O) 15 cm H2O   MAP (cm H2O) 7.1   I/E Ratio 1:4.4   Plateau Pressure (cm H2O) 13 cm H2O   Static Compliance (L/cm H2O) 75   Alarms   High RR 40   Insp Pressure High (cm H2O) 40 cm H2O   Insp Pressure Low (cm H2O) 9 cm H2O   MV High (L/min) 20 L/min   MV Low (L/min) 2 L/min   Apnea Interval (sec) 20 seconds   Apnea Rate 14   Apnea Volume (mL) 500 mL   ETT   Placement Date/Time: 03/13/24 (c) 0012   Airway Size: 8 mm  Cuffed: Cuffed  Insertion attempts: 1  Technique: Video laryngoscopy  Placement Verification: Capnometry  Placed By: Anesthesiologist   Secured at (cm) 28 cm   Suctioned? Y   Measured From Lips   Secured Location Right   Secured by Commercial tube pendleton   Site Condition Dry   Req'd equipment at bedside Bag mask     Pt remain on ventilator with above setting. Pt not stable for weaning.

## 2024-03-13 NOTE — PLAN OF CARE
Pt received ~midnight. Pt with irregular respirations/tachypnea, accessory muscle use- obstructing - with 02 sats dropping to ~40%. Pt diaphoretic, in respiratory distress. LUIS Gonzalez called to bedside- plan for intubation for airway protection.   Sedated on propofol- fentanyl. Eyes deviated upward- luis Gonzalez updated. CTH done  CTA chest completed.  Weaning propofol for rass goal of -1.   B/L swr  Straight cath x1  Ng to LIS

## 2024-03-13 NOTE — PROGRESS NOTES
[facility]   part of [facility]     Hospitalist Progress Note     Philip Castellano Patient Status:  Inpatient    1985 MRN [id number]   Location [facility] 3NE-A Attending Guero Sepulveda,    Hosp Day # 2 PCP Tish Guzman     Chief Complaint: seizure activity    Subjective:     Pt transferred to icu overnight for airway protection, intubation, sedation.     Pt now on fentanyl + propofol gtt. Sedated. On ventilator.     Objective:    Review of Systems:   A comprehensive review of systems was completed; pertinent positive and negatives stated in subjective.    Vital signs:  Temp:  [97.3 °F (36.3 °C)-99.7 °F (37.6 °C)] 98.1 °F (36.7 °C)  Pulse:  [] 111  Resp:  [12-35] 14  BP: ()/() 97/71  SpO2:  [88 %-100 %] 97 %  FiO2 (%):  [70 %-100 %] 70 %    Physical Exam:    General: No acute distress, sedated on ventilator   Respiratory: mechanical breath sounds present  Cardiovascular: S1, S2, regular rate and rhythm  Abdomen: Soft, Non-tender, non-distended, positive bowel sounds  Neuro: No new focal deficits.   Extremities: No edema      Diagnostic Data:    Labs:  Recent Labs   Lab 24  1443 24  0555 24  0429   WBC 6.7 5.9 5.0   HGB 13.2 11.0* 10.8*   MCV 98.0 95.5 97.2   PLT 96.0* 60.0* 63.0*       Recent Labs   Lab 24  1443 24  0555 24  1504 24  0033 24  0429   * 74  --   --  69*   BUN 5* 4*  --   --  5*   CREATSERUM 1.06 0.64*  --   --  0.39*   CA 8.7 7.7*  --   --  7.7*   ALB 4.6 3.9  --   --  3.4    134*  --   --  139   K 3.7 2.5* 2.9* 3.4* 2.8*    95*  --   --  102   CO2 14.0* 30.0  --   --  21.0   ALKPHO 72 57  --   --  47   * 69*  --   --  86*   ALT 41 30  --   --  31   BILT 1.1 1.8  --   --  1.4   TP 7.9 6.7  --   --  5.8*       CrCl cannot be calculated (Unknown ideal weight.).    Recent Labs   Lab 24  0033   *       No results for input(s): \"PTP\", \"INR\" in the last 168 hours.                Microbiology    No results found for this visit on 03/11/24.      Imaging: Reviewed in Epic.    Medications:    chlorhexidine gluconate  15 mL Mouth/Throat BID@0800,2000    pantoprazole  40 mg Intravenous QAM AC    vancomycin  125 mg Per NG Tube 4x daily    piperacillin-tazobactam  3.375 g Intravenous Q8H    potassium phosphate dibasic 30 mmol in sodium chloride 0.9% 250 mL IVPB  30 mmol Intravenous Once    potassium phosphate dibasic 15 mmol in sodium chloride 0.9% 250 mL IVPB  15 mmol Intravenous Once    magnesium sulfate  4 g Intravenous Once    PHENObarbital  97.5 mg Intravenous Q8H    Followed by    [START ON 3/14/2024] PHENObarbital  65 mg Intravenous Q8H    Followed by    [START ON 3/16/2024] PHENObarbital  32.5 mg Intravenous Q8H    thiamine  100 mg Intravenous Daily    folic acid  1 mg Intravenous Daily    multivitamin  1 tablet Oral Daily       Assessment & Plan:      #Acute hypoxemic resp failure  -intubated 3/12 for airway protection  -sedated w/ fentanyl + propofol gtt, advised to back of fentanyl gtt and use precedex and 2nd if not 1st agent + propofol, consider prn iv ativan + scheduled phenobarbital     #Seizures  -suspect ETOH withdrawal seizures  -given keppra in ER; since dc'ed by neuro  -EEG reviewed  -neuro eval noted and signed off  -CT brain negative      #Alcohol withdrawals  #Delirium tremons  -CIWA protocol w/ prn benzos  -cont MVI, thiamine, folic acid     #Hyponatremia  -trend labs, cont ivf    #Elevated AST and TCP  -due to ETOH abuse  -trend     #Facial / nose laceration and Partial hematoma         Guero Sepulveda DO    Supplementary Documentation:     Quality:  DVT Mechanical Prophylaxis:   SCDs,    DVT Pharmacologic Prophylaxis   Medication   None                Code Status: Full Code  Stanley: External urinary catheter in place  Stanley Duration (in days):   Central line:    JAZIEL:     Discharge is dependent on: clinical improvement  At this point Mr. Castellano is expected to be  discharge to: tbd    The 21st Century Cures Act makes medical notes like these available to patients in the interest of transparency. Please be advised this is a medical document. Medical documents are intended to carry relevant information, facts as evident, and the clinical opinion of the practitioner. The medical note is intended as peer to peer communication and may appear blunt or direct. It is written in medical language and may contain abbreviations or verbiage that are unfamiliar.

## 2024-03-13 NOTE — CONSULTS
ICU  Critical Care APRN Progress Note    NAME: Philip Castellano - ROOM: 459/459-A - MRN: [id number] - Age: 38 year old - :1985    History Of Present Illness:  Philip Castellano is a 38 year old male with PMHx significant for ETOH abuse who was found unresponsive at home after his mom heard him fall. ED work up notable for a large hematoma on the right side of his head and a laceration to his nose for which a level 2 trauma was called. Reportedly he alsos had a witnessed seizure in the ED lasting ~15 seconds for which he was loaded with IV keppra. Ethyl alcohol level 151 on admission. He was admitted to CTU3 for closer monitoring. A code support was called tonight for excalating CIWA scores and increasing agitation. Arrives to ICU today for closer monitoring.    PMH:  Past Medical History:   Diagnosis Date    Alcoholism (HCC)     Clavicle fracture        Social Hx:  Social History     Socioeconomic History    Marital status: Single   Tobacco Use    Smoking status: Former    Smokeless tobacco: Current   Vaping Use    Vaping Use: Former   Substance and Sexual Activity    Alcohol use: Yes     Comment: 200 shots /wk    Drug use: No    Sexual activity: Yes     Partners: Female     Social Determinants of Health     Food Insecurity: No Food Insecurity (3/11/2024)    Food Insecurity     Food Insecurity: Never true   Transportation Needs: No Transportation Needs (3/11/2024)    Transportation Needs     Lack of Transportation: No   Housing Stability: Low Risk  (3/11/2024)    Housing Stability     Housing Instability: No       Family Hx:  Family History   Problem Relation Age of Onset    Hypertension Mother     Hypertension Father          Review of Systems:   A comprehensive 10 point review of systems was completed.  Pertinent positives and negatives noted in the HPI.    Current Facility-Administered Medications   Medication Dose Route Frequency    LORazepam (Ativan) tab 1 mg  1 mg Oral Q4H PRN    Or    LORazepam (Ativan) 2  mg/mL injection 1 mg  1 mg Intravenous Q4H PRN    LORazepam (Ativan) tab 2 mg  2 mg Oral Q2H PRN    Or    LORazepam (Ativan) 2 mg/mL injection 2 mg  2 mg Intravenous Q2H PRN    LORazepam (Ativan) tab 3 mg  3 mg Oral Q1H PRN    Or    LORazepam (Ativan) 2 mg/mL injection 3 mg  3 mg Intravenous Q1H PRN    LORazepam (Ativan) 2 mg/mL injection 4 mg  4 mg Intravenous Q30 Min PRN    LORazepam (Ativan) 2 mg/mL injection 5 mg  5 mg Intravenous Q15 Min PRN    LORazepam (Ativan) 2 mg/mL injection 6 mg  6 mg Intravenous Q10 Min PRN    haloperidol lactate (Haldol) 5 MG/ML injection 2 mg  2 mg Intravenous Q8H PRN    PHENobarbital (Luminal) 65 mg/mL injection 97.5 mg  97.5 mg Intravenous Q8H    Followed by    [START ON 3/14/2024] PHENobarbital (Luminal) 65 mg/mL injection 65 mg  65 mg Intravenous Q8H    Followed by    [START ON 3/16/2024] PHENobarbital (Luminal) 65 mg/mL injection 32.5 mg  32.5 mg Intravenous Q8H    [COMPLETED] diazepam (Valium) 5 mg/mL injection 10 mg  10 mg Intravenous Once    potassium chloride 40 mEq in 250mL sodium chloride 0.9% IVPB premix  40 mEq Intravenous Once    vancomycin (Vancocin) cap 125 mg  125 mg Oral 4x daily    thiamine 100 mg/mL injection 100 mg  100 mg Intravenous Daily    folic acid (Folvite) 1 mg in sodium chloride 0.9% 50 mL IVPB  1 mg Intravenous Daily    multivitamin (Tab-A-Tara/Beta Carotene) tab 1 tablet  1 tablet Oral Daily    sodium chloride 0.9% infusion   Intravenous Continuous    acetaminophen (Tylenol Extra Strength) tab 500 mg  500 mg Oral Q4H PRN    ibuprofen (Motrin) tab 200 mg  200 mg Oral Q4H PRN    Or    ibuprofen (Motrin) tab 400 mg  400 mg Oral Q4H PRN    Or    ibuprofen (Motrin) tab 600 mg  600 mg Oral Q4H PRN    LORazepam (Ativan) tab 1 mg  1 mg Oral Q1H PRN    Or    LORazepam (Ativan) 2 mg/mL injection 1 mg  1 mg Intravenous Q1H PRN    Or    LORazepam (Ativan) tab 2 mg  2 mg Oral Q1H PRN    Or    LORazepam (Ativan) 2 mg/mL injection 2 mg  2 mg Intravenous Q1H PRN     ondansetron (Zofran) 4 MG/2ML injection 4 mg  4 mg Intravenous Q6H PRN    prochlorperazine (Compazine) 10 MG/2ML injection 5 mg  5 mg Intravenous Q8H PRN       OBJECTIVE  Vitals:  BP (!) 154/111   Pulse (!) 178   Temp 99.7 °F (37.6 °C) (Axillary)   Resp (!) 30   Wt 127 lb 10.3 oz (57.9 kg)   SpO2 98%   BMI 16.84 kg/m²                  Physical Exam:    General Appearance: Agitated, uncooperative  Neck: No JVD, neck supple, no adenopathy, trachea midline, no carotid bruits  Lungs: Clear to auscultation bilaterally, respirations unlabored  Heart: Regular rate and rhythm, S1 and S2 normal, no murmur, rub or gallop  Abdomen: Soft, non-tender, bowel sounds active all four quadrants, no masses, no organomegaly  Extremities: Extremities normal, atraumatic, no cyanosis or edema, capillary refill <3 sec.    Pulses: 2+ and symmetric all extremities  Skin: forehead laceration    Data this admission:  XR CHEST AP PORTABLE  (CPT=71045)    Result Date: 3/11/2024  PROCEDURE:  XR CHEST AP PORTABLE  (CPT=71045)  TECHNIQUE:  AP chest radiograph was obtained.  COMPARISON:  None.  INDICATIONS:  mom heard thud, heavy ETOH use. Left hematoma, neck lac. Found in tub 238 level 2 trauma called. was coming around for EMS, seizing on arrival.  PATIENT STATED HISTORY: (As transcribed by Technologist)  Pt.   mom heard thud, heavy ETOH use. Left hematoma, neck lac. Found in tub 238 level 2 trauma called. was coming around for EMS, seizing on arrival.   FINDINGS: Cardiac silhouette and pulmonary vasculature are unremarkable. No consolidation, pleural effusion or pneumothorax.  Prominence of the right hilum is noted. IMPRESSION: No consolidation.  Prominence of the right hilum is noted.  A CT chest examination with contrast evaluate this region to exclude a mass is recommended.   LOCATION:  [facility]      Dictated by (CST): Agustin Rapp MD on 3/11/2024 at 5:53 PM     Finalized by (CST): Agustin Rapp MD on 3/11/2024 at 5:54 PM        CT ABDOMEN+PELVIS(CONTRAST ONLY)(CPT=74177)    Result Date: 3/11/2024  CONCLUSION:  1. Nonspecific fat stranding surrounding the ascending colon and cecum.  Findings may be related to nonspecific colitis. 2. Severe diffuse hepatic steatosis.   LOCATION:  [id number]   Dictated by (CST): Sadiq Gordon MD on 3/11/2024 at 5:08 PM     Finalized by (CST): Sadiq Gordon MD on 3/11/2024 at 5:11 PM       CT BRAIN OR HEAD (19420)    Result Date: 3/11/2024  CONCLUSION:   1. Negative for acute intracranial process.  2. Right frontal parietal scalp laceration and hematoma without calvarial fracture.    LOCATION:  [id number]   Dictated by (CST): Deandre Villalta MD on 3/11/2024 at 3:32 PM     Finalized by (CST): Deandre Villalta MD on 3/11/2024 at 3:34 PM       CT SPINE CERVICAL (CPT=72125)    Result Date: 3/11/2024  CONCLUSION:   1. No evidence of acute traumatic injury of the cervical spine.  2. Disc and endplate degenerative changes at C5-6 resulting in mild central canal stenosis and moderate left-sided foraminal stenosis.     LOCATION:  [id number]   Dictated by (CST): Deandre Villalta MD on 3/11/2024 at 3:08 PM     Finalized by (CST): Deandre Villalta MD on 3/11/2024 at 3:12 PM         Labs:  Lab Results   Component Value Date    WBC 5.9 03/12/2024    HGB 11.0 03/12/2024    HCT 32.0 03/12/2024    PLT 60.0 03/12/2024    CREATSERUM 0.64 03/12/2024    BUN 4 03/12/2024     03/12/2024    K 2.9 03/12/2024    CL 95 03/12/2024    CO2 30.0 03/12/2024    GLU 74 03/12/2024    CA 7.7 03/12/2024    ALB 3.9 03/12/2024    ALKPHO 57 03/12/2024    BILT 1.8 03/12/2024    TP 6.7 03/12/2024    AST 69 03/12/2024    ALT 30 03/12/2024           Assessment/Plan:    ETOH withdrawal  Delirium Tremens  - Ethyl alcohol 151 on admission 3/11  - [facility] protocol  - MVI/thiamine/folic acid  - S/p 10mg IV Diazepam x1 per hospitalist  - PRN ativan  - PRN haldol  - Scheduled phenobarb- EtCO2 monitoring  - Precedex ONLY if refractory to benzos  - Restraints  - Psych  consult    Seizures  - Likely 2/2 to etoh withdrawal  - Keppra x1 in ED  - EEG without evidence of seizures  - CT brain/spine negative for acute intracranial process  - Seizure precautions  - Will check CK  - Neuro following- no further work up at this time    Tachycardia/HTN  - Likely 2/2 to increased agitation  - EKG  - IVF  - May need low dose BB if no improvement    Scalp laceration/hematoma  - CT brain/spine as above  - Trauma surgery evaluated, now signed off  - Monitor    Cdiff  - Cdiff positive, EIA negative  - CT abd with possible colitis  - PO vanc    Thrombocytopenia  - Likely 2/2 to etoh use  - No s/s of bleeding  - Monitor    Prominence of right hilum?  - Seen on chest xray  - CTA chest when mental status/agitation improved    F/E/N  - IVF  - Replete electrolytes per protocol  - NPO now due to worsening mentation    Proph  -  Has not been on DVT ppx? Will need to clarify with general surgery and/or neurology if ok to start subcutaneous lovenox in AM.  - SCDs  - PT/OT    Dispo  - Full code, discussed with Mother via phone.  - ICU monitoring    Plan of care discussed with intensivist on-call, Dr. Brooks    A total of 35 minutes of critical care time (exclusive of billable procedures) was administered. This involved direct patient intervention, complex decision making, and/or extensive discussions (>50% face to face time) with the patient, family, and clinical staff.    Vickie PARMAR-BC  Critical Care  [id number]

## 2024-03-13 NOTE — DIETARY NOTE
[facility]   part of [facility]  NUTRITION ASSESSMENT    Unable to diagnose malnutrition criteria at this time.    NUTRITION INTERVENTION:    Meal and Snacks - ADAT once extubated.  Enteral Nutrition - Via NGT, while propofol infusing at current rate, recommend initiating Vital AF 1.2 at 10 ml/hr and advancing 10 ml/hr q6hrs to GOAL: Vital AF at 50 ml/hr.   This will provide 1440 kcal, 90 grams protein, 972 ml total free water, and 96% of RDI's.   Recommend 80 ml water flush q 4 hours, TF+FWF provides 1452 ml total fluids.   Discontinue IVF once TF initiated.  Coordination of Nutrition Care - Recommend SLP consult prior to diet advancement.    PATIENT STATUS: 38 year old male admitted on 3/11 presents s/p fall from alcohol intoxication. RRT called yesterday d/t increased agitation and high CIWA; transferred to ICU and intubated overnight for airway protection. Pt screened d/t consult for TF recs. Pt is currently intubated, sedated on propofol, NPO with NGT to LIS. No plan to extubate today. Noted K+, Mg and Phos all low - concern for refeeding syndrome. Folic acid and thiamine IV already ordered and being given. Will provide TF recs above and advance slowly. Recommend aggressive electrolytes replacement protocol.    PMH:  has a past medical history of Alcoholism (HCC) and Clavicle fracture.    ANTHROPOMETRICS:  Ht:  6'1\"  Wt: 59.1 kg (130 lb 4.7 oz).   BMI: Body mass index is 17.19 kg/m².  IBW: 83.6 kg    WEIGHT HISTORY:   Patient Weight(s) for the past 336 hrs:   Weight   03/13/24 0545 59.1 kg (130 lb 4.7 oz)   03/12/24 2140 57.9 kg (127 lb 10.3 oz)   03/11/24 2214 72.6 kg (160 lb)       Wt Readings from Last 10 Encounters:   03/13/24 59.1 kg (130 lb 4.7 oz)   05/12/20 72.6 kg (160 lb)   01/16/19 68 kg (150 lb)   01/15/19 67.6 kg (149 lb 0.5 oz)   09/12/18 67.6 kg (149 lb)   06/27/18 70.8 kg (156 lb)        NUTRITION:  Diet:       Procedures    Regular/General diet Is Patient on Accuchecks? No         Percent Meals Eaten (last 3 days)       None            Food Allergies: No  Cultural/Ethnic/[place] Preferences Addressed: Yes    GI SYSTEM REVIEW: diarrhea; last BM 3/13  Skin/Wounds: WNL    NUTRITION RELATED PHYSICAL FINDINGS:     1. Body Fat/Muscle Mass:  YAHAIRA     2. Fluid Accumulation: none per RN documentation     NUTRITION PRESCRIPTION: 59.1 kg  Calories: 1777 calories/day ([employer]; MV:7 L/min, Temp:37.6 C)  Protein:  grams protein/day (1.5-2.0 gm/kg)  Fluid: ~1 ml/kcal or per MD discretion    NUTRITION DIAGNOSIS/PROBLEM:  Inadequate oral intake related to respiratory process or complication of therapy which results in mechanical ventilation as evidenced by need for NPO status    MONITOR AND EVALUATE/NUTRITION GOALS:  Weight stable within 1 to 2 lbs during admission - New  Start alternative nutrition in 24-48 hrs if diet is not able to advance- New      MEDICATIONS:  Folic acid 1 mg, multivitamin, protonix, abx, thiamine 100 mg  Gtt: precedex, D5W-NS at 100 ml/hr, fentanyl, propofol at 10.9 ml/hr (provides ~288 kcal/day)    LABS:  BG 69, K+ 2.8, Mg 1.4, Phos 0.7, , POC glucose:  mg/dl    Pt is at High nutrition risk    Janel Loco RD, LDN, [facility]  Clinical Dietitian  Spectra: [id number]

## 2024-03-13 NOTE — PAYOR COMM NOTE
--------------  CONTINUED STAY REVIEW    Payor: [employer]  Subscriber #:  [id number]  Authorization Number: [id number]    Admit date: 3/11/24  Admit time:  6:21 PM    Admitting Physician: Sera Harris MD  Attending Physician:  Guero Sepulveda DO  Primary Care Physician: Tish Guzman      3-12-24 2050    Reason for RRT: Agitation, high CIWA       Called by nurse given increased agitation, hallucinations, requiring increased doses of ativan, rising CIWA scores. Pt confused, tachycardic and hypertensive. Noted to be intoxicated on admission with hx of binge drinking and withdrawals     Physical Exam:    BP (!) 147/104 (BP Location: Right arm)   Pulse 113   Temp 98.1 °F (36.7 °C) (Oral)   Resp 18   Wt 160 lb (72.6 kg)   SpO2 97%   BMI 21.11 kg/m²   General: Anxious, agitated, sweating  Respiratory: CTAB  Cardiovascular: Tachycardic rate and rythmn  Abdomen: Soft NT, ND  Neurologic: Agitated, Aox1  Extremities: WWP    ASSESSMENT / PLAN:      # Alcohol withdrawal with c/f Delirium tremens   - Continue CIWA   - Given Diazepam IV 10mg once on floor   - D/w ICU APRN, plan for Phenobarbital taper and precedex ggt in ICU     - Transferred to ICU given high risk for decompensation with severe withdrawals    Date/Time: 3/13/2024 12:12 AM  Urgency: elective       Critical Care     Progress Notes     Addendum     Date of Service: 3/13/2024  4:34 AM       Called to bedside by RN for acute desaturation with increased tongue swelling noted. Patient with tachypnea, tachycardic with accessory muscle use to breath. ABG without hypercapnia however concern for patient ability to protect airway. Decision made to intubate, anesthesia called to bedside. Post intubation ABG with PO2 75 on 100% Fi02 with persistent tachycardia. Ddimer 1.67, will send for CTA chest to r/o PE as patient has not been on DVT chemoprophylaxis this admission. RN also noted upward gaze, will send for STAT CT brain. Low grade fevers noted, chest xray with  possible aspiration pneumonia, will cover with empiric zosyn, send blood/sputum/urine cultures.            Patient location during procedure: ICU  Anesthesiologist: Duc Rodriguez MD  Performed: anesthesiologist   Performed by: Duc Rodriguez MD  Authorized by: Duc Rodriguez MD       Indications and Patient Condition  Indications for airway management: airway protection, pulmonary toilet, CNS depression and hypoxemia  Sedation level: deep  Preoxygenated: yes  Patient position: sniffing  Mask difficulty assessment: 1 - vent by mask     Final Airway Details  Final airway type: endotracheal airway           REVIEW DOCUMENTATION:  3-13-24      Pt transferred to icu overnight for airway protection, intubation, sedation.      Pt now on fentanyl + propofol gtt. Sedated. On ventilator.     FiO2 (%):  [70 %-100 %] 70 %     Physical Exam:    General: No acute distress, sedated on ventilator   Respiratory: mechanical breath sounds present  Cardiovascular: S1, S2, regular rate and rhythm  Abdomen: Soft, Non-tender, non-distended, positive bowel sounds  Neuro: No new focal deficits.   Extremities: No edema        Diagnostic Data:    Labs:      Recent Labs   Lab 03/13/24  0429   WBC 5.0   HGB 10.8*   MCV 97.2   PLT 63.0*                 Recent Labs   * 74  --   --  69*   BUN 5* 4*  --   --  5*   CREATSERUM 1.06 0.64*  --   --  0.39*   CA 8.7 7.7*  --   --  7.7*   ALB 4.6 3.9  --   --  3.4    134*  --   --  139   K 3.7 2.5* 2.9* 3.4* 2.8*    95*  --   --  102   CO2 14.0* 30.0  --   --  21.0   ALKPHO 72 57  --   --  47   * 69*  --   --  86*   ALT 41 30  --   --  31   BILT 1.1 1.8  --   --  1.4   TP 7.9 6.7  --   --  5.8*         CrCl cannot be calculated (Unknown ideal weight.).         Recent Labs   Lab 03/13/24  0033   *     Assessment & Plan:   #Acute hypoxemic resp failure  -intubated 3/12 for airway protection  -sedated w/ fentanyl + propofol gtt, advised to back of fentanyl gtt and use  precedex and 2nd if not 1st agent + propofol, consider prn iv ativan + scheduled phenobarbital      #Seizures  -suspect ETOH withdrawal seizures  -given keppra in ER; since dc'ed by neuro  -EEG reviewed  -neuro eval noted and signed off  -CT brain negative      #Alcohol withdrawals  #Delirium tremons  -CIWA protocol w/ prn benzos  -cont MVI, thiamine, folic acid     #Hyponatremia  -trend labs, cont ivf     #Elevated AST and TCP  -due to ETOH abuse  -trend     #Facial / nose laceration and Partial hematoma          MEDICATIONS ADMINISTERED IN LAST 1 DAY:  chlorhexidine gluconate (Peridex) 0.12 % oral solution 15 mL       Date Action Dose Route User    3/13/2024 0944 Given 15 mL Mouth/Throat Janie Vivas RN          dexmedeTOMIDine in sodium chloride 0.9% (Precedex) 400 mcg/100mL infusion premix       Date Action Dose Route User    3/13/2024 0931 New Bag 0.4 mcg/kg/hr × 72.6 kg (Dosing Weight) Intravenous Janie Vivas RN          dextrose 50% injection 50 mL       Date Action Dose Route User    3/13/2024 0535 Given 50 mL Intravenous Elva Gutierrez RN          dextrose 50% injection       Date Action Dose Route User    3/13/2024 0535 Given 50 mL Intravenous Elva Gutierrez RN          dextrose 5%-sodium chloride 0.9% infusion       Date Action Dose Route User    3/13/2024 0547 New Bag (none) Intravenous Elva Gutierrez RN          diazepam (Valium) 5 mg/mL injection 10 mg       Date Action Dose Route User    3/12/2024 2244 Given 10 mg Intravenous Emiliano Membreno RN          diazepam (Valium) 5 mg/mL injection       Date Action Dose Route User    3/12/2024 2244 Given 10 mg Intravenous Emiliano Membreno RN          fentaNYL (Sublimaze) 25 mcg BOLUS FROM BAG infusion       Date Action Dose Route User    3/13/2024 0938 Bolus from Bag 25 mcg Intravenous Janie Vivas RN          fentaNYL in sodium chloride 0.9% (Sublimaze) 1000 mcg/100mL infusion premix       Date Action Dose Route User     3/13/2024 0039 New Bag 25 mcg/hr Intravenous Elva Gutierrez RN          fentaNYL (Sublimaze) 50 mcg/mL injection 50 mcg       Date Action Dose Route User    3/13/2024 0053 Given 50 mcg Intravenous Elva Gutierrez RN          iopamidol 76% (ISOVUE-370) injection for power injector       Date Action Dose Route User    3/13/2024 0536 Given 80 mL Intravenous Anais Serrano          LORazepam (Ativan) tab 1 mg       Date Action Dose Route User    3/12/2024 1727 Given 1 mg Oral Annika Mcmanus RN          LORazepam (Ativan) tab 2 mg       Date Action Dose Route User    3/12/2024 2031 Given 2 mg Oral Princess Giovanna Soto RN    3/12/2024 1918 Given 2 mg Oral Annika Mcmanus RN          LORazepam (Ativan) 2 mg/mL injection 1 mg       Date Action Dose Route User    3/12/2024 1520 Given 1 mg Intravenous Bre Flowers RN    3/12/2024 1400 Given 1 mg Intravenous Annika Mcmanus RN          LORazepam (Ativan) 2 mg/mL injection 6 mg       Date Action Dose Route User    3/12/2024 2235 Given 6 mg Intravenous Emiliano Membreno RN    3/12/2024 2139 Given 6 mg Intravenous Emiliano Membreno RN          LORazepam (Ativan) 2 mg/mL injection       Date Action Dose Route User    3/12/2024 2139 Given 6 mg Intravenous Emiliano Membreno RN          LORazepam (Ativan) 2 mg/mL injection 2 mg       Date Action Dose Route User    3/13/2024 0027 Given 2 mg Intravenous Elva Gutierrez RN          magnesium sulfate 4 g/100mL IVPB premix 4 g       Date Action Dose Route User    3/13/2024 0629 New Bag 4 g Intravenous Elva Gutierrez RN          pantoprazole (Protonix) 40 mg in sodium chloride 0.9% PF 10 mL IV push       Date Action Dose Route User    3/13/2024 0629 Given 40 mg Intravenous Elva Gutierrez RN          PHENobarbital (Luminal) 65 mg/mL injection 97.5 mg       Date Action Dose Route User    3/13/2024 0435 Given 97.5 mg Intravenous Elva Gutierrez, RN    3/12/2024 2119 Given 97.5 mg  Intravenous Emiliano Membreno RN          piperacillin-tazobactam (Zosyn) 3.375 g in dextrose 5% 100 mL IVPB-ADDV       Date Action Dose Route User    3/13/2024 0431 New Bag 3.375 g Intravenous Elva Gutierrez RN          potassium chloride 40 mEq in 250mL sodium chloride 0.9% IVPB premix       Date Action Dose Route User    3/12/2024 2142 New Bag 40 mEq Intravenous Emiliano Membreno RN          potassium chloride (K-Dur) tab 40 mEq       Date Action Dose Route User    3/12/2024 1358 Given 40 mEq Oral Annika Mcmanus RN          potassium chloride (K-Dur) tab 40 mEq       Date Action Dose Route User    3/12/2024 1729 Given 40 mEq Oral Annika Mcmanus RN          potassium phosphate dibasic 30 mmol in sodium chloride 0.9% 250 mL IVPB       Date Action Dose Route User    3/13/2024 0629 New Bag 30 mmol Intravenous Elva Gutierrez RN          potassium phosphate dibasic 15 mmol in sodium chloride 0.9% 250 mL IVPB       Date Action Dose Route User    3/13/2024 0946 New Bag 15 mmol Intravenous Janie Vivas RN          propofol (Diprivan) 10 MG/ML injection       Date Action Dose Route User    3/13/2024 0011 Bolus from Bag 100 mg Intravenous Elva Gutierrez RN          propofol (Diprivan) 10 mg/mL infusion premix       Date Action Dose Route User    3/13/2024 1106 Rate/Dose Change 5 mcg/kg/min × 72.6 kg (Dosing Weight) Intravenous Janie Vivas RN    3/13/2024 1046 Rate/Dose Change 15 mcg/kg/min × 72.6 kg (Dosing Weight) Intravenous Janie Vivas RN    3/13/2024 0933 Rate/Dose Change 25 mcg/kg/min × 72.6 kg (Dosing Weight) Intravenous Janie Vivas RN    3/13/2024 0636 Rate/Dose Change 15 mcg/kg/min × 72.6 kg (Dosing Weight) Intravenous Elva Gutierrez RN    3/13/2024 0546 Rate/Dose Change 20 mcg/kg/min × 72.6 kg (Dosing Weight) Intravenous Elva Gutierrez RN    3/13/2024 0359 Rate/Dose Change 30 mcg/kg/min × 72.6 kg (Dosing Weight) Intravenous Lynne Heard RN     3/13/2024 0307 New Bag 40 mcg/kg/min × 72.6 kg (Dosing Weight) Intravenous Elva Gutierrez RN    3/13/2024 0045 Rate/Dose Change 50 mcg/kg/min × 72.6 kg (Dosing Weight) Intravenous Elva Gutierrez RN    3/13/2024 0033 Rate/Dose Change 40 mcg/kg/min × 72.6 kg (Dosing Weight) Intravenous Elva Gutierrez RN    3/13/2024 0028 Rate/Dose Change 30 mcg/kg/min × 72.6 kg (Dosing Weight) Intravenous Elva Gutierrez RN    3/13/2024 0023 Rate/Dose Change 20 mcg/kg/min × 72.6 kg (Dosing Weight) Intravenous Elva Gutierrez RN    3/13/2024 0018 New Bag 10 mcg/kg/min × 72.6 kg (Dosing Weight) Intravenous Elva Gutierrez RN    3/13/2024 0011 Bolus from Bag 100 mg Intravenous Elva Gutierrez RN          sodium chloride 0.9% infusion       Date Action Dose Route User    3/12/2024 2127 New Bag (none) Intravenous Emiliano Membreno RN    3/12/2024 1526 New Bag (none) Intravenous Bre Flowers RN          sodium chloride 0.9 % IV bolus 500 mL       Date Action Dose Route User    3/13/2024 0159 New Bag 500 mL Intravenous Elva Gutierrez RN          succinylcholine (Anectine) 20 MG/ML injection       Date Action Dose Route User    3/13/2024 0012 Given 200 mg (none) Elva Gutierrez RN          succinylcholine (Anectine) 20 MG/ML injection       Date Action Dose Route User    3/13/2024 0011 Given 100 mg Intravenous Duc Rodriguez MD          multivitamin (Tab-A-Tara/Beta Carotene) tab 1 tablet       Date Action Dose Route User    3/13/2024 0945 Given 1 tablet Oral Janie Vivas RN          thiamine 100 mg/mL injection 100 mg       Date Action Dose Route User    3/13/2024 0945 Given 100 mg Intravenous Janie Vivas RN          vancomycin (Firvanq) 50 mg/mL oral solution 125 mg       Date Action Dose Route User    3/13/2024 0704 Given 125 mg Per NG Tube Elva Gutierrez, RN    3/13/2024 0205 Given 125 mg Per NG Tube Elva Gutierrez, RN            Vitals (last day)        Date/Time Temp Pulse Resp BP SpO2 Weight O2 Device O2 Flow Rate (L/min) [facility]    03/13/24 1130 -- -- -- 87/62 -- -- -- --     03/13/24 1100 98.4 °F (36.9 °C) 101 14 87/54 95 % -- -- --     03/13/24 1003 -- 113 14 -- 94 % -- -- --     03/13/24 1000 99 °F (37.2 °C) 109 14 101/78 94 % -- -- --     03/13/24 0315 -- 109 14 92/63 -- -- -- --     03/13/24 0300 97.3 °F (36.3 °C) 108 14 95/64 99 % -- -- --     03/13/24 0245 -- 109 14 90/62 -- -- -- --     03/13/24 0230 -- 111 14 91/64 -- -- -- --     03/13/24 0215 -- 118 14 92/69 -- -- -- --     03/13/24 0200 -- 121 14 87/65 99 % -- -- --     03/13/24 0145 -- 126 14 89/64 -- -- -- --     03/13/24 0115 -- 130 14 94/68 98 % -- -- --     03/13/24 0101 99.5 °F (37.5 °C) 124 12 112/91 96 % -- -- --     03/13/24 0100 -- 121 15 -- 97 % -- -- --     03/13/24 0045 -- 136 17 115/80 96 % -- -- --     03/12/24 2120 -- 150 31 159/121 100 % -- -- --     03/12/24 2110 -- 162 20 199/170 -- -- -- -- TS    03/12/24 1000 -- 114 -- 144/98 -- -- -- -- CS    03/12/24 0800 99.6 °F (37.6 °C) 120 20 135/103 96 % -- None (Room air) 0 L/min ALEJANDRO    03/12/24 0600 -- 107 -- 134/100 96 % -- -- -- AB    03/12/24 0400 100 °F (37.8 °C) 106 20 145/104 97 % -- None (Room air) -- EN    03/12/24 0326 -- 111 -- -- 98 % -- -- -- EN    03/12/24 0320 -- 106 -- -- 98 % -- -- -- AB    03/12/24 0200 -- 112 20 144/97 98 % -- None (Room air) 0 L/min EN    03/12/24 0120 -- 101 -- -- 96 % -- None (Room air) -- EN    03/12/24 0000 97.8 °F (36.6 °C) 112 21 148/109 100 % -- None (Room air) -- AB          CIWA Scores (since admission)       Date/Time CIWA-Ar Total Who    03/12/24 2245 6 TS    03/12/24 2235 32 TS    03/12/24 2150 6 TS    03/12/24 2134 32 TS    03/12/24 2020 22 PY    03/12/24 1900 16 CS    03/12/24 1827 5 CS    03/12/24 1800 6 CS    03/12/24 1719 8 CS    03/12/24 1620 6 CS    03/12/24 1600 5 CS    03/12/24 1516 11 RG    03/12/24 1500 8 CS    03/12/24 1400 7 CS

## 2024-03-13 NOTE — PLAN OF CARE
Family at bedside reports pt attended ETOH program this past summer and was sober for about 30days however did not continue with the treatment that was recommended thereafter and relapsed.  They report he doesn't like to participate with therapy and state his ETOH abuse started after his fathers death 8 years ago.  They report he lives isolated life and his dog passed away 2 weeks ago which contributed to increased drinking.  He has episodes of anxiety.  He has been smoking cannibus daily per sister.  They report he has never been this sick in the past. All questions answered as able.

## 2024-03-13 NOTE — SIGNIFICANT EVENT
[facility]  RAPID RESPONSE NOTE     Philip Castellano Patient Status:  Inpatient    1985 MRN [id number]   Location [facility] 4SW-A Attending Guero Sepulveda, DO   Hosp Day # 1 PCP Tish Guzman     Reason for RRT: Agitation, high CIWA       Called by nurse given increased agitation, hallucinations, requiring increased doses of ativan, rising CIWA scores. Pt confused, tachycardic and hypertensive. Noted to be intoxicated on admission with hx of binge drinking and withdrawals    Physical Exam:    BP (!) 147/104 (BP Location: Right arm)   Pulse 113   Temp 98.1 °F (36.7 °C) (Oral)   Resp 18   Wt 160 lb (72.6 kg)   SpO2 97%   BMI 21.11 kg/m²   General: Anxious, agitated, sweating  Respiratory: CTAB  Cardiovascular: Tachycardic rate and rythmn  Abdomen: Soft NT, ND  Neurologic: Agitated, Aox1  Extremities: WWP    Diagnostic Data:      Labs: Reviewed in EMR    Imaging: Reviewed in EMR    ASSESSMENT / PLAN:     # Alcohol withdrawal with c/f Delirium tremens   - Continue CIWA   - Given Diazepam IV 10mg once on floor   - D/w ICU APRN, plan for Phenobarbital taper and precedex ggt in ICU     - Transferred to ICU given high risk for decompensation with severe withdrawals    Critical care time: 40 spent on critical care, exclusive of time spent on other billable procedures    Rahul Babcock MD  3/12/2024

## 2024-03-13 NOTE — PLAN OF CARE
Was making plans to transfer patient when RRT was called - in severe DT's - sweating off EKG leads, IV, pulse ox, etc, constant tremors, speech mostly garbled, sonorous respirations in between breaths while talking.   To ICU with PCT and bedside nurse - note incontinent of stool (specimen sent that was Cdiff +), transferred to ICU bed - EtCO2 placed (not reading as is mouth breathing), reattached 4 pt soft and posey restraints, scheduled phenobarb given prior to Ativan due to face that Valium had just been given prior to transport.  After first dose Ativan 6 mg IV he was able to state that he was in the hospital for a fall and that they year is 2024 - rest of speech unintelligible, sweating stopped completely as did tremors, he was calm, maintained eye contact.  Note left pupil slightly larger than right but both brisk (and all 4 extremities with strong movement).  After period of calmness CIWA again 32 and again given 6 Ativan, now sedated/sleeping.  SCD's have been applied, critical care APN at bedside multiple times for reassessments.      Entered room just prior to midnight, note sonorous breathing became suddenly much more labored.  Patient desatted to 38% but then recovered to 90's.  Note distal tongue fills entire oral cavity with mouth open.  APN notified - orders to intubate (done).

## 2024-03-13 NOTE — ANESTHESIA PROCEDURE NOTES
Airway  Date/Time: 3/13/2024 12:12 AM  Urgency: elective      General Information and Staff    Patient location during procedure: ICU  Anesthesiologist: Duc Rodriguez MD  Performed: anesthesiologist   Performed by: Duc Rodriguez MD  Authorized by: Duc Rodriguez MD      Indications and Patient Condition  Indications for airway management: airway protection, pulmonary toilet, CNS depression and hypoxemia  Sedation level: deep  Preoxygenated: yes  Patient position: sniffing  Mask difficulty assessment: 1 - vent by mask    Final Airway Details  Final airway type: endotracheal airway      Successful airway: ETT  Cuffed: yes   Successful intubation technique: Video laryngoscopy  Endotracheal tube insertion site: oral  Blade size: #3  ETT size (mm): 8.0    Cormack-Lehane Classification: grade I - full view of glottis  Placement verified by: capnometry   Measured from: lips  ETT to lips (cm): 24  Number of attempts at approach: 1

## 2024-03-13 NOTE — PROGRESS NOTES
[facility]    Philip Castellano Patient Status:  Inpatient    1985 MRN [id number]   [facility] 4SW-A Attending Guero Sepulveda,    Hosp Day # 2 PCP Tish Guzman     Critical Care Progress Note     Date of Admission: 3/11/2024  2:38 PM  Admission Diagnosis: Metabolic acidosis [E87.20]  Thrombocytopenia (HCC) [D69.6]  Contusion of nose, initial encounter [S00.33XA]  Closed head injury, initial encounter [S09.90XA]  Scalp laceration, initial encounter [S01.01XA]  Alcohol withdrawal seizure without complication (HCC) [F10.930, R56.9]     S: overnight events reviewed.  DTs overnight.  Has resp distress requiring intubation for airway protection.  Currently intubated on vent awake despite sedation.      Current Medications:    Current Facility-Administered Medications:     fentaNYL (Sublimaze) 50 mcg/mL injection 25 mcg, 25 mcg, Intravenous, Q30 Min PRN **OR** fentaNYL (Sublimaze) 50 mcg/mL injection 50 mcg, 50 mcg, Intravenous, Q30 Min PRN    acetaminophen (Tylenol) tab 650 mg, 650 mg, Oral, Q4H PRN **OR** acetaminophen (Tylenol) 160 MG/5ML oral liquid 650 mg, 650 mg, Oral, Q4H PRN **OR** acetaminophen (Tylenol) rectal suppository 650 mg, 650 mg, Rectal, Q4H PRN **OR** acetaminophen (Ofirmev) 10 mg/mL infusion premix 1,000 mg, 1,000 mg, Intravenous, Q6H PRN    fentaNYL (Sublimaze) 25 mcg BOLUS FROM BAG infusion, 25 mcg, Intravenous, Q30 Min PRN **OR** fentaNYL (Sublimaze) 50 mcg BOLUS FROM BAG infusion, 50 mcg, Intravenous, Q30 Min PRN    fentaNYL in sodium chloride 0.9% (Sublimaze) 1000 mcg/100mL infusion premix,  mcg/hr, Intravenous, Continuous PRN    polyethylene glycol (PEG 3350) (Miralax) 17 g oral packet 17 g, 17 g, Oral, Daily PRN    senna (Senokot) 8.8 MG/5ML oral syrup 17.6 mg, 10 mL, Oral, Nightly PRN    bisacodyl (Dulcolax) 10 MG rectal suppository 10 mg, 10 mg, Rectal, Daily PRN    fleet enema (Fleet) 7-19 GM/118ML rectal enema 133 mL, 1 enema, Rectal, Once PRN    chlorhexidine  gluconate (Peridex) 0.12 % oral solution 15 mL, 15 mL, Mouth/Throat, BID@0800,2000    propofol (Diprivan) 10 mg/mL infusion premix, 5-50 mcg/kg/min (Dosing Weight), Intravenous, Continuous    pantoprazole (Protonix) 40 mg in sodium chloride 0.9% PF 10 mL IV push, 40 mg, Intravenous, QAM AC    vancomycin (Firvanq) 50 mg/mL oral solution 125 mg, 125 mg, Per NG Tube, 4x daily    piperacillin-tazobactam (Zosyn) 3.375 g in dextrose 5% 100 mL IVPB-ADDV, 3.375 g, Intravenous, Q8H    potassium phosphate dibasic 30 mmol in sodium chloride 0.9% 250 mL IVPB, 30 mmol, Intravenous, Once    potassium phosphate dibasic 15 mmol in sodium chloride 0.9% 250 mL IVPB, 15 mmol, Intravenous, Once    glucose (Dex4) 15 GM/59ML oral liquid 15 g, 15 g, Oral, Q15 Min PRN **OR** glucose (Glutose) 40% oral gel 15 g, 15 g, Oral, Q15 Min PRN **OR** glucose-vitamin C (Dex-4) chewable tab 4 tablet, 4 tablet, Oral, Q15 Min PRN **OR** dextrose 50% injection 50 mL, 50 mL, Intravenous, Q15 Min PRN **OR** glucose (Dex4) 15 GM/59ML oral liquid 30 g, 30 g, Oral, Q15 Min PRN **OR** glucose (Glutose) 40% oral gel 30 g, 30 g, Oral, Q15 Min PRN **OR** glucose-vitamin C (Dex-4) chewable tab 8 tablet, 8 tablet, Oral, Q15 Min PRN    dextrose 5%-sodium chloride 0.9% infusion, , Intravenous, Continuous    haloperidol lactate (Haldol) 5 MG/ML injection 2 mg, 2 mg, Intravenous, Q8H PRN    PHENobarbital (Luminal) 65 mg/mL injection 97.5 mg, 97.5 mg, Intravenous, Q8H **FOLLOWED BY** [START ON 3/14/2024] PHENobarbital (Luminal) 65 mg/mL injection 65 mg, 65 mg, Intravenous, Q8H **FOLLOWED BY** [START ON 3/16/2024] PHENobarbital (Luminal) 65 mg/mL injection 32.5 mg, 32.5 mg, Intravenous, Q8H    dexmedeTOMIDine in sodium chloride 0.9% (Precedex) 400 mcg/100mL infusion premix, 0.2-1.5 mcg/kg/hr (Dosing Weight), Intravenous, Continuous    thiamine 100 mg/mL injection 100 mg, 100 mg, Intravenous, Daily    folic acid (Folvite) 1 mg in sodium chloride 0.9% 50 mL IVPB, 1 mg,  Intravenous, Daily    multivitamin (Tab-A-Tara/Beta Carotene) tab 1 tablet, 1 tablet, Oral, Daily    acetaminophen (Tylenol Extra Strength) tab 500 mg, 500 mg, Oral, Q4H PRN    ibuprofen (Motrin) tab 200 mg, 200 mg, Oral, Q4H PRN **OR** ibuprofen (Motrin) tab 400 mg, 400 mg, Oral, Q4H PRN **OR** ibuprofen (Motrin) tab 600 mg, 600 mg, Oral, Q4H PRN    ondansetron (Zofran) 4 MG/2ML injection 4 mg, 4 mg, Intravenous, Q6H PRN    prochlorperazine (Compazine) 10 MG/2ML injection 5 mg, 5 mg, Intravenous, Q8H PRN     OBJECTIVE:  BP 97/71   Pulse 110   Temp 98.1 °F (36.7 °C)   Resp 14   Wt 130 lb 4.7 oz (59.1 kg)   SpO2 96%   BMI 17.19 kg/m²      Vent Mode: VC+  FiO2 (%):  [40 %-100 %] 40 %  S RR:  [14] 14  S VT:  [500 mL] 500 mL  PEEP/CPAP (cm H2O):  [5 cm H20] 5 cm H20  MAP (cm H2O):  [7.1-7.6] 7.4      Wt Readings from Last 3 Encounters:   03/13/24 130 lb 4.7 oz (59.1 kg)   05/12/20 160 lb (72.6 kg)   01/16/19 150 lb (68 kg)        I/O last 3 completed shifts:  In: 2666.9 [I.V.:1766.9; IV PIGGYBACK:900]  Out: 1100 [Urine:950; Emesis/NG output:150]  I/O this shift:  In: 4.5 [I.V.:4.5]  Out: -      General appearance: alert, appears stated age, and slowed mentation  Lungs: clear to auscultation bilaterally  Heart: regular rate and rhythm  Abdomen: soft, non-tender; bowel sounds normal; no masses,  no organomegaly  Extremities: extremities normal, atraumatic, no cyanosis or edema     Lab Results   Component Value Date    WBC 5.0 03/13/2024    RBC 3.27 03/13/2024    HGB 10.8 03/13/2024    HCT 31.8 03/13/2024    MCV 97.2 03/13/2024    MCH 33.0 03/13/2024    MCHC 34.0 03/13/2024    RDW 12.9 03/13/2024    PLT 63.0 03/13/2024     Lab Results   Component Value Date     03/13/2024    K 2.8 03/13/2024     03/13/2024    CO2 21.0 03/13/2024    BUN 5 03/13/2024    CREATSERUM 0.39 03/13/2024    GLU 69 03/13/2024    CA 7.7 03/13/2024    ALKPHO 47 03/13/2024    ALT 31 03/13/2024    AST 86 03/13/2024    BILT 1.4  03/13/2024    ALB 3.4 03/13/2024    TP 5.8 03/13/2024     No results found for: \"PT\", \"INR\"     Recent Labs   Lab 03/13/24  0040   ABGPHT 7.44   FUGTPV9J 34*   OCPRX9B 75*   ABGHCO3 24.4   ABGBE -0.6   TEMP 98.6   LAURA Positive   SITE Right Radial   DEV    THGB 11.7*          Imaging: reviewed. Dense RLL consolidation, no PE.  CT brain neg for acute changes.     Assessment/Plan:     ETOH withdrawal/Delirium Tremens  - Ethyl alcohol 151 on admission 3/11  - CIWA protocol  - MVI/thiamine/folic acid  - PRN ativan  - PRN haldol  - Scheduled phenobarb  - Restraints  - Psych consulted  Acute resp failure- due to inability to protect airway when on heavy sedation as well as tongue laceration/trauma.  CTA chest also suggestive of RLL PNA c/w aspiration PNA  - intubated; wean FiO2 as tolerated  - SBT when sedation needs improve  Seizures - Likely 2/2 to etoh withdrawal  - Keppra x1 in ED  - EEG without evidence of seizures  - CT brain/spine negative for acute intracranial process  - Seizure precautions  - CK minimally elevated, not c/w rhabdo  - Neuro following- no further work up at this time   Tachycardia/HTN  - Likely 2/2 to increased agitation  - prn BB if no improvement     Scalp laceration/hematoma  - CT brain/spine unremarkable  - Trauma surgery evaluated, now signed off  - Monitor     Cdiff  - Cdiff positive, EIA negative  - CT abd with possible colitis  - PO vanc     Thrombocytopenia  - Likely 2/2 to etoh use  - No s/s of bleeding  - Monitor  Abnormal CT chest- due to asp PNA, mucous plugging atelectasis  - may benefit from repeat CT scan as OP to ensure resolution  F/E/N  - IVF  - Replete electrolytes per protocol  - start TFs today     Proph - SCDs for DVT prophylaxis. Chemical proph relatively contraindicated bc of TCP and recent trauma to head.     Dispo - Full code  - ICU monitoring    Critical Care Time greater than: 45 minutes    Pete Brooks MD  3/13/2024  9:36 AM

## 2024-03-13 NOTE — PLAN OF CARE
Received patient after report. Patient resting in bed intubated on ventilator. Upon initial assessment patient well sedated. Appearance of possible upward eye deviation, withdraws from pain concurrently. Sedation lightened. Patient agitated upon awakening. No deviation to eyes while awake and following commands in all extremities. Straight cath for urine. Family at bedside. Updated on plan of care.

## 2024-03-14 LAB
ALBUMIN SERPL-MCNC: 2.9 G/DL (ref 3.4–5)
ALBUMIN/GLOB SERPL: 1.1 {RATIO} (ref 1–2)
ALP LIVER SERPL-CCNC: 44 U/L
ALT SERPL-CCNC: 30 U/L
ANION GAP SERPL CALC-SCNC: 7 MMOL/L (ref 0–18)
AST SERPL-CCNC: 66 U/L (ref 15–37)
BASOPHILS # BLD AUTO: 0.04 X10(3) UL (ref 0–0.2)
BASOPHILS NFR BLD AUTO: 0.9 %
BILIRUB SERPL-MCNC: 1.2 MG/DL (ref 0.1–2)
BUN BLD-MCNC: 3 MG/DL (ref 9–23)
C DIFF GDH + TOXINS A+B STL QL IA.RAPID: DETECTED
CALCIUM BLD-MCNC: 7.2 MG/DL (ref 8.5–10.1)
CHLORIDE SERPL-SCNC: 108 MMOL/L (ref 98–112)
CO2 SERPL-SCNC: 23 MMOL/L (ref 21–32)
CREAT BLD-MCNC: 0.31 MG/DL
EGFRCR SERPLBLD CKD-EPI 2021: 153 ML/MIN/1.73M2 (ref 60–?)
EOSINOPHIL # BLD AUTO: 0.09 X10(3) UL (ref 0–0.7)
EOSINOPHIL NFR BLD AUTO: 2.1 %
ERYTHROCYTE [DISTWIDTH] IN BLOOD BY AUTOMATED COUNT: 12.9 %
GLOBULIN PLAS-MCNC: 2.6 G/DL (ref 2.8–4.4)
GLUCOSE BLD-MCNC: 102 MG/DL (ref 70–99)
GLUCOSE BLD-MCNC: 111 MG/DL (ref 70–99)
GLUCOSE BLD-MCNC: 115 MG/DL (ref 70–99)
GLUCOSE BLD-MCNC: 132 MG/DL (ref 70–99)
GLUCOSE BLD-MCNC: 132 MG/DL (ref 70–99)
GLUCOSE BLD-MCNC: 151 MG/DL (ref 70–99)
HCT VFR BLD AUTO: 30.5 %
HGB BLD-MCNC: 10.7 G/DL
IMM GRANULOCYTES # BLD AUTO: 0.02 X10(3) UL (ref 0–1)
IMM GRANULOCYTES NFR BLD: 0.5 %
LYMPHOCYTES # BLD AUTO: 0.61 X10(3) UL (ref 1–4)
LYMPHOCYTES NFR BLD AUTO: 14 %
MAGNESIUM SERPL-MCNC: 2 MG/DL (ref 1.6–2.6)
MCH RBC QN AUTO: 33.9 PG (ref 26–34)
MCHC RBC AUTO-ENTMCNC: 35.1 G/DL (ref 31–37)
MCV RBC AUTO: 96.5 FL
MONOCYTES # BLD AUTO: 0.3 X10(3) UL (ref 0.1–1)
MONOCYTES NFR BLD AUTO: 6.9 %
NEUTROPHILS # BLD AUTO: 3.31 X10 (3) UL (ref 1.5–7.7)
NEUTROPHILS # BLD AUTO: 3.31 X10(3) UL (ref 1.5–7.7)
NEUTROPHILS NFR BLD AUTO: 75.6 %
OSMOLALITY SERPL CALC.SUM OF ELEC: 283 MOSM/KG (ref 275–295)
PHOSPHATE SERPL-MCNC: 1.1 MG/DL (ref 2.5–4.9)
PLATELET # BLD AUTO: 71 10(3)UL (ref 150–450)
POTASSIUM SERPL-SCNC: 3 MMOL/L (ref 3.5–5.1)
POTASSIUM SERPL-SCNC: 3 MMOL/L (ref 3.5–5.1)
POTASSIUM SERPL-SCNC: 4.2 MMOL/L (ref 3.5–5.1)
PROT SERPL-MCNC: 5.5 G/DL (ref 6.4–8.2)
RBC # BLD AUTO: 3.16 X10(6)UL
SODIUM SERPL-SCNC: 138 MMOL/L (ref 136–145)
WBC # BLD AUTO: 4.4 X10(3) UL (ref 4–11)

## 2024-03-14 PROCEDURE — 99291 CRITICAL CARE FIRST HOUR: CPT | Performed by: INTERNAL MEDICINE

## 2024-03-14 PROCEDURE — 99233 SBSQ HOSP IP/OBS HIGH 50: CPT | Performed by: HOSPITALIST

## 2024-03-14 RX ORDER — POTASSIUM CHLORIDE 14.9 MG/ML
20 INJECTION INTRAVENOUS ONCE
Status: COMPLETED | OUTPATIENT
Start: 2024-03-14 | End: 2024-03-14

## 2024-03-14 RX ORDER — DEXAMETHASONE SODIUM PHOSPHATE 4 MG/ML
10 VIAL (ML) INJECTION ONCE
Qty: 3 ML | Refills: 0 | Status: COMPLETED | OUTPATIENT
Start: 2024-03-14 | End: 2024-03-14

## 2024-03-14 RX ORDER — DEXAMETHASONE SODIUM PHOSPHATE 4 MG/ML
4 VIAL (ML) INJECTION EVERY 6 HOURS
Status: DISCONTINUED | OUTPATIENT
Start: 2024-03-14 | End: 2024-03-16

## 2024-03-14 NOTE — PROGRESS NOTES
ICU  Critical Care APRN Progress Note    NAME: Philip Castellano - ROOM: 459/459-A - MRN: [id number] - Age: 38 year old - :1985    Subjective: Patient remains intubated and sedated.  Tongue with bruising and swelling.    OBJECTIVE  Vitals:  BP (!) 128/99   Pulse 75   Temp 97.7 °F (36.5 °C)   Resp 14   Ht 185.4 cm (6' 1\")   Wt 136 lb 14.5 oz (62.1 kg)   SpO2 100%   BMI 18.06 kg/m²              Ventilator Settings: VC+ 14// FIO2 40/PEEP 5.    Physical Exam:    General Appearance:sedated, no distress, appears stated age  Neck: No JVD, neck supple, no adenopathy, trachea midline, no carotid bruits  Lungs: Clear to auscultation bilaterally, respirations unlabored  Heart: Regular rate and rhythm, S1 and S2 normal, no murmur, rub or gallop  Abdomen: Soft, non-tender, bowel sounds active all four quadrants, no masses, no organomegaly  Extremities: Extremities normal, atraumatic, no cyanosis or edema,capillary refill <3 sec.    Pulses: 2+ and symmetric all extremities  Skin: Skin color, texture, turgor normal for ethnicity, no rashes or lesions, warm and dry  Neurologic: sedated    Data this admission:  XR CHEST AP PORTABLE  (CPT=71045)    Result Date: 3/13/2024  CONCLUSION:  1. Placement of ET tube and NG tube when compared to 3/11/2024 examination. 2. Radiopaque body projects over the GE junction, correlate clinically. 3. Slight elevation of the right hemidiaphragm with development of right lower lobe retrocardiac opacity may represent atelectasis, infection or aspiration cannot be excluded, correlate clinically.  Clinical service notified of these findings with preliminary radiology report from [facility] services.    LOCATION:  [facility]      Dictated by (CST): Anya Marquez MD on 3/13/2024 at 7:38 AM     Finalized by (CST): Anya Marquez MD on 3/13/2024 at 7:41 AM       CT ANGIOGRAPHY, CHEST (CPT=71275)    Result Date: 3/13/2024  CONCLUSION:  1. No CT evidence for pulmonary embolism. 2. Right lower lobe partial  atelectasis with endobronchial fluid/debris, consider aspiration.  Recommend follow-up to ensure resolution and to exclude a perihilar mass.  Clinical service notified of these findings with preliminary radiology report from [facility] services.     LOCATION:  [facility]   Dictated by (CST): Anya Marquez MD on 3/13/2024 at 7:10 AM     Finalized by (CST): Anya Marquez MD on 3/13/2024 at 7:16 AM       CT BRAIN OR HEAD ([id number])    Result Date: 3/13/2024  CONCLUSION:  No acute intracranial hemorrhage.  Clinical service notified of these findings with preliminary radiology report from [facility] services.   LOCATION:  [facility]   Dictated by (CST): Anya Marquez MD on 3/13/2024 at 6:24 AM     Finalized by (CST): Anya Marquez MD on 3/13/2024 at 6:25 AM       XR CHEST AP PORTABLE  (CPT=71045)    Result Date: 3/11/2024  PROCEDURE:  XR CHEST AP PORTABLE  (CPT=71045)  TECHNIQUE:  AP chest radiograph was obtained.  COMPARISON:  None.  INDICATIONS:  mom heard thud, heavy ETOH use. Left hematoma, neck lac. Found in tub 238 level 2 trauma called. was coming around for EMS, seizing on arrival.  PATIENT STATED HISTORY: (As transcribed by Technologist)  Pt.   mom heard thud, heavy ETOH use. Left hematoma, neck lac. Found in tub 238 level 2 trauma called. was coming around for EMS, seizing on arrival.   FINDINGS: Cardiac silhouette and pulmonary vasculature are unremarkable. No consolidation, pleural effusion or pneumothorax.  Prominence of the right hilum is noted. IMPRESSION: No consolidation.  Prominence of the right hilum is noted.  A CT chest examination with contrast evaluate this region to exclude a mass is recommended.   LOCATION:  [facility]      Dictated by (CST): Agustin Rapp MD on 3/11/2024 at 5:53 PM     Finalized by (CST): Agustin Rapp MD on 3/11/2024 at 5:54 PM       CT ABDOMEN+PELVIS(CONTRAST ONLY)(CPT=74177)    Result Date: 3/11/2024  CONCLUSION:  1. Nonspecific fat stranding surrounding the ascending colon and cecum.  Findings  may be related to nonspecific colitis. 2. Severe diffuse hepatic steatosis.   LOCATION:  [id number]   Dictated by (CST): Sadiq Gordon MD on 3/11/2024 at 5:08 PM     Finalized by (CST): Sadiq Gordon MD on 3/11/2024 at 5:11 PM       CT BRAIN OR HEAD ([id number])    Result Date: 3/11/2024  CONCLUSION:   1. Negative for acute intracranial process.  2. Right frontal parietal scalp laceration and hematoma without calvarial fracture.    LOCATION:  [id number]   Dictated by (CST): Deandre Villalta MD on 3/11/2024 at 3:32 PM     Finalized by (CST): Deandre Villalta MD on 3/11/2024 at 3:34 PM       CT SPINE CERVICAL (CPT=72125)    Result Date: 3/11/2024  CONCLUSION:   1. No evidence of acute traumatic injury of the cervical spine.  2. Disc and endplate degenerative changes at C5-6 resulting in mild central canal stenosis and moderate left-sided foraminal stenosis.     LOCATION:  [id number]   Dictated by (CST): Deandre Villalta MD on 3/11/2024 at 3:08 PM     Finalized by (CST): Deandre Villalta MD on 3/11/2024 at 3:12 PM         Labs:  Lab Results   Component Value Date    WBC 4.4 03/14/2024    HGB 10.7 03/14/2024    HCT 30.5 03/14/2024    PLT 71.0 03/14/2024    CREATSERUM 0.31 03/14/2024    BUN 3 03/14/2024     03/14/2024    K 3.0 03/14/2024    K 3.0 03/14/2024     03/14/2024    CO2 23.0 03/14/2024     03/14/2024    CA 7.2 03/14/2024    ALB 2.9 03/14/2024    ALKPHO 44 03/14/2024    BILT 1.2 03/14/2024    TP 5.5 03/14/2024    AST 66 03/14/2024    ALT 30 03/14/2024    MG 2.0 03/14/2024    PHOS 1.1 03/14/2024       Assessment/Plan:      ETOH withdrawal/Delirium Tremens  - Ethyl alcohol 151 on admission 3/11  - [facility] protocol  - MVI/thiamine/folic acid  - PRN ativan  - PRN haldol  - Scheduled phenobarb  - Restraints  - Psych consulted    Acute resp failure- due to inability to protect airway when on heavy sedation as well as tongue laceration/trauma.  CTA chest also suggestive of RLL PNA c/w aspiration PNA  - intubated; wean FiO2 as  tolerated  - SBT when sedation needs improve  -Start decadron for swelling.  - Continue Zosyn (3/13-)    Seizures - Likely 2/2 to etoh withdrawal  - Keppra x1 in ED  - EEG without evidence of seizures  - CT brain/spine negative for acute intracranial process  - Seizure precautions  - CK minimally elevated, not c/w rhabdo  - Neuro following- no further work up at this time     Tachycardia/HTN  - Likely 2/2 to increased agitation  - prn BB if no improvement     Scalp laceration/hematoma  - CT brain/spine unremarkable  - Trauma surgery evaluated, now signed off  - Monitor     Cdiff  - Cdiff positive, EIA negative  - CT abd with possible colitis  - PO vanc     Thrombocytopenia  - Likely 2/2 to etoh use  - No s/s of bleeding  - Monitor    Abnormal CT chest- due to asp PNA, mucous plugging atelectasis  - may benefit from repeat CT scan as OP to ensure resolution    F/E/N  - Replete electrolytes per protocol  - TF     Proph - SCDs for DVT prophylaxis. Chemical proph relatively contraindicated bc of TCP and recent trauma to head.     Dispo - Full code  - ICU monitoring    Plan of care discussed with intensivist Dr. Brooks.    Ayde Bhatti [facility]  Critical Care NP  Phone [phone]    Pulm/Crit Care attending addendum:  - pt seen and examined with ICU APN.  Agree with A/P as detailed above.  - generally, pt seems better.  Will begin weaning sedation slowly to prevent sudden agitation.  - start decadron for lingual swelling which is thought to have been due to trauma when seizure occurred.  Cont with zosyn for asp PNA and PO vanco for cdif colitis.  - replace electrolytes aggressively via IV/OGT  - hopeful for SBT tomorrow.  - remains critically ill, crit care time: 37 min    Pete Brooks MD

## 2024-03-14 NOTE — PROGRESS NOTES
03/14/24 1615   Vent Information   $ RT Standby Charge (per 15 min) 1   Vent Initiation Date 03/13/24   Ventilation Day(s) 2   Interface Invasive   Vent Type    Vent plugged into main power? Yes   Vent -8   Vent Mode VC+   Settings   FiO2 (%) 40 %   Resp Rate (Set) 14   Vt (Set, mL) 500 mL   Waveform Decelerating ramp   PEEP/CPAP (cm H2O) 5 cm H20   Insp Time (sec) 0.8 sec   Insp Rise Time (%) 50 %   Trigger Sensitivity Flow (L/min) 3 L/min   Humidification Heater   H2O Bag Level Filled   Heater Temperature 98.6 °F (37 °C)   Readings   Total RR 15   Minute Ventilation (L/min) 8 L/min   Inspiratory Tidal Volume 509 mL   Expiratory Tidal Volume 501 mL   PIP Observed (cm H2O) 16 cm H2O   MAP (cm H2O) 7   I/E Ratio 1:3   Plateau Pressure (cm H2O) 12 cm H2O   Alarms   High RR 40   Insp Pressure High (cm H2O) 40 cm H2O   Insp Pressure Low (cm H2O) 9 cm H2O   MV High (L/min) 20 L/min   MV Low (L/min) 2 L/min   Apnea Interval (sec) 20 seconds   Apnea Rate 14   Apnea Volume (mL) 500 mL   ETT   Placement Date/Time: 03/13/24 (c) 0015   Airway Size: 8 mm  Cuffed: Cuffed  Insertion attempts: 1  Technique: Video laryngoscopy  Placement Verification: Capnometry  Placed By: Anesthesiologist   Secured at (cm) 28 cm   Suctioned? Y   Measured From Lips   Secured Location Center   Secured by Commercial tube pendleton   Site Condition Dry   Req'd equipment at bedside Bag mask     Pt stable, resting comfortably. Will continue to monitor

## 2024-03-14 NOTE — PLAN OF CARE
Received pt sedated with propofol, precedex and fentanyl on ventilator. Arousable to speech, follows commands. Agitated at times, attempting to sit up and get out of bed and asynchronous with ventilator; PRN fentanyl given per order. NG intact. Advancing TF as tolerated. Male external catheter intact, monitoring output. Afebrile. VSS. NSR on monitor. See flowsheets. See MAR.

## 2024-03-14 NOTE — PROGRESS NOTES
[facility]   part of [facility]     Hospitalist Progress Note     Philip Castellano Patient Status:  Inpatient    1985 MRN [id number]   Location [facility] 3NE-A Attending Guero Sepulveda,    Hosp Day # 3 PCP Tish Guzman     Chief Complaint: seizure activity    Subjective:     Pt remains intubated on precedex + propofol + fentanyl     Objective:    Review of Systems:   A comprehensive review of systems was completed; pertinent positive and negatives stated in subjective.    Vital signs:  Temp:  [95.9 °F (35.5 °C)-99.1 °F (37.3 °C)] 98.2 °F (36.8 °C)  Pulse:  [] 84  Resp:  [12-15] 14  BP: ()/() 113/89  SpO2:  [92 %-100 %] 100 %  FiO2 (%):  [40 %] 40 %    Physical Exam:    General: No acute distress, sedated on ventilator   Respiratory: mechanical breath sounds present  Cardiovascular: S1, S2, regular rate and rhythm  Abdomen: Soft, Non-tender, non-distended, positive bowel sounds  Neuro: No new focal deficits.   Extremities: No edema      Diagnostic Data:    Labs:  Recent Labs   Lab 24  1443 24  0555 24  0429 24  0430   WBC 6.7 5.9 5.0 4.4   HGB 13.2 11.0* 10.8* 10.7*   MCV 98.0 95.5 97.2 96.5   PLT 96.0* 60.0* 63.0* 71.0*       Recent Labs   Lab 24  0555 24  1504 24  0429 24  1724 24  0430   GLU 74  --  69*  --  111*   BUN 4*  --  5*  --  3*   CREATSERUM 0.64*  --  0.39*  --  0.31*   CA 7.7*  --  7.7*  --  7.2*   ALB 3.9  --  3.4  --  2.9*   *  --  139  --  138   K 2.5*   < > 2.8* 3.0* 3.0*  3.0*   CL 95*  --  102  --  108   CO2 30.0  --  21.0  --  23.0   ALKPHO 57  --  47  --  44*   AST 69*  --  86*  --  66*   ALT 30  --  31  --  30   BILT 1.8  --  1.4  --  1.2   TP 6.7  --  5.8*  --  5.5*    < > = values in this interval not displayed.       Estimated Creatinine Clearance: 278.2 mL/min (A) (based on SCr of 0.31 mg/dL (L)).    Recent Labs   Lab 24  0033   *       No results for input(s): \"PTP\", \"INR\" in  the last 168 hours.               Microbiology    Hospital Encounter on 03/11/24   1. Blood Culture     Status: None (Preliminary result)    Collection Time: 03/13/24  4:29 AM    Specimen: Blood,peripheral   Result Value Ref Range    Blood Culture Result No Growth 1 Day N/A         Imaging: Reviewed in Epic.    Medications:    potassium phosphate dibasic 30 mmol in sodium chloride 0.9% 250 mL IVPB  30 mmol Intravenous Once    Followed by    potassium chloride  20 mEq Intravenous Once    dexamethasone  4 mg Intravenous Q6H    insulin aspart  1-5 Units Subcutaneous 4 times per day    potassium phosphate monobasic  500 mg Per NG Tube TID    chlorhexidine gluconate  15 mL Mouth/Throat BID@0800,2000    pantoprazole  40 mg Intravenous QAM AC    vancomycin  125 mg Per NG Tube 4x daily    piperacillin-tazobactam  3.375 g Intravenous Q8H    thiamine  100 mg Oral Daily    folic acid  1 mg Oral Daily    PHENObarbital  97.5 mg Intravenous Q8H    Followed by    PHENObarbital  65 mg Intravenous Q8H    Followed by    [START ON 3/16/2024] PHENObarbital  32.5 mg Intravenous Q8H    multivitamin  1 tablet Oral Daily       Assessment & Plan:      #Acute hypoxemic resp failure  #Aspiration pneumonia  -intubated 3/12 for airway protection  -sedated w/ precedex + fentanyl + propofol gtt + scheduled phenobarbital   -iv zosyn (3/13-)    #Seizures  -suspect ETOH withdrawal seizures  -given keppra in ER; since dc'ed by neuro  -EEG reviewed  -neuro eval noted and signed off  -CT brain negative     #Cdiff  -po vanco (3/13-)    #Tongue laceration   #Inflammation in oral cavity  -decadron     #Alcohol withdrawals  #Delirium tremons  -CIWA protocol w/ scheduled phenobarbital   -cont MVI, thiamine, folic acid     #Hyponatremia  -trend labs, cont ivf    #Elevated AST and TCP  -due to ETOH abuse  -trend     #Facial / nose laceration and Partial hematoma         Guero Sepulveda DO    Supplementary Documentation:     Quality:  DVT Mechanical Prophylaxis:    SCDs,    DVT Pharmacologic Prophylaxis   Medication   None                Code Status: Full Code  Stanley: External urinary catheter in place  Stanley Duration (in days):   Central line:    JAZIEL:     Discharge is dependent on: clinical improvement  At this point Mr. Castellano is expected to be discharge to: tbd    The 21st Century Cures Act makes medical notes like these available to patients in the interest of transparency. Please be advised this is a medical document. Medical documents are intended to carry relevant information, facts as evident, and the clinical opinion of the practitioner. The medical note is intended as peer to peer communication and may appear blunt or direct. It is written in medical language and may contain abbreviations or verbiage that are unfamiliar.

## 2024-03-14 NOTE — PLAN OF CARE
Received patient following report. Patient orally intubated to ventilator. 40%/+5. O2 saturations adequate. Sedated with propofol, precedex and fentanyl. When sedation lowered patient able to follow commands with all extremities but becomes agitated, attempting to sit up in bed, sedation optimized. NSR on tele. BP stable. Hypothermic, bare hugger applied. NG to TF, increasing to goal per order. Voiding via external cath, bladder scanning as needed. No BM. See flowsheets and MAR.

## 2024-03-14 NOTE — PHYSICAL THERAPY NOTE
Following for PT.  Pt transferred to ICU, currently intubated.  Will f/u for further PT sessions later during admit.

## 2024-03-14 NOTE — PROGRESS NOTES
03/14/24 0505   Vent Information   $ RT Standby Charge (per 15 min) 1   Vent Initiation Date 03/13/24   Ventilation Day(s) 2   Interface Invasive   Vent Type    Vent plugged into main power? Yes   Vent -8   Vent Mode VC+   Settings   FiO2 (%) 40 %   Resp Rate (Set) 14   Vt (Set, mL) 500 mL   Waveform Decelerating ramp   PEEP/CPAP (cm H2O) 5 cm H20   Insp Time (sec) 0.8 sec   Insp Rise Time (%) 50 %   Trigger Sensitivity Flow (L/min) 3 L/min   Humidification Heater   H2O Bag Level Filled   Heater Temperature 98.8 °F (37.1 °C)   Readings   Total RR 14   Minute Ventilation (L/min) 6.9 L/min   Expiratory Tidal Volume 496 mL   PIP Observed (cm H2O) 15 cm H2O   MAP (cm H2O) 7   I/E Ratio 1:4.4   Alarms   High RR 40   Insp Pressure High (cm H2O) 40 cm H2O   Insp Pressure Low (cm H2O) 9 cm H2O   MV High (L/min) 20 L/min   MV Low (L/min) 2 L/min   Apnea Interval (sec) 20 seconds   Apnea Rate 14   Apnea Volume (mL) 500 mL     Pt remains intubated with current settings. Wean as vishal

## 2024-03-14 NOTE — PROGRESS NOTES
03/13/24 2039   Vent Information   Vent Initiation Date 03/13/24   Ventilation Day(s) 1   Interface Invasive   Vent Type    Vent plugged into main power? Yes   Vent ID [id number]   Vent Mode VC+   Settings   FiO2 (%) 40 %   Resp Rate (Set) 14   Vt (Set, mL) 500 mL   Waveform Decelerating ramp   PEEP/CPAP (cm H2O) 5 cm H20   Insp Time (sec) 0.8 sec   Insp Rise Time (%) 50 %   Trigger Sensitivity Flow (L/min) 3 L/min   Humidification Heater   H2O Bag Level 1/2 Full   Heater Temperature 98.6 °F (37 °C)   Readings   Total RR 14   Minute Ventilation (L/min) 7.3 L/min   Inspiratory Tidal Volume 500 mL   Expiratory Tidal Volume 501 mL   PIP Observed (cm H2O) 16 cm H2O   MAP (cm H2O) 7.2   I/E Ratio 1:4.4   Plateau Pressure (cm H2O) 13 cm H2O   Alarms   High RR 40   Insp Pressure High (cm H2O) 40 cm H2O   Insp Pressure Low (cm H2O) 9 cm H2O   MV High (L/min) 20 L/min   MV Low (L/min) 2 L/min   Apnea Interval (sec) 20 seconds   Apnea Rate 14   Apnea Volume (mL) 500 mL   ETT   Placement Date/Time: 03/13/24 (c) 0012   Airway Size: 8 mm  Cuffed: Cuffed  Insertion attempts: 1  Technique: Video laryngoscopy  Placement Verification: Capnometry  Placed By: Anesthesiologist   Secured at (cm) 28 cm   Suctioned? Y   Measured From Lips   Secured Location Center   Secured by Commercial tube pendleton   Site Condition Dry   Site changed Rotated   Req'd equipment at bedside Bag mask     Received patient intubated and on ventilator. Patient breath sounds are diminished bilaterally. Patient has no cough when suctioned. Secretions are small, thick, and white.     Zhane Salcido, MSc, RRT

## 2024-03-15 ENCOUNTER — APPOINTMENT (OUTPATIENT)
Dept: CT IMAGING | Facility: HOSPITAL | Age: 39
End: 2024-03-15
Attending: INTERNAL MEDICINE
Payer: MEDICAID

## 2024-03-15 LAB
ALBUMIN SERPL-MCNC: 2.7 G/DL (ref 3.4–5)
ALBUMIN/GLOB SERPL: 1 {RATIO} (ref 1–2)
ALP LIVER SERPL-CCNC: 51 U/L
ALT SERPL-CCNC: 25 U/L
ANION GAP SERPL CALC-SCNC: 5 MMOL/L (ref 0–18)
AST SERPL-CCNC: 31 U/L (ref 15–37)
BASOPHILS # BLD AUTO: 0.04 X10(3) UL (ref 0–0.2)
BASOPHILS NFR BLD AUTO: 0.5 %
BILIRUB SERPL-MCNC: 0.6 MG/DL (ref 0.1–2)
BUN BLD-MCNC: 3 MG/DL (ref 9–23)
CALCIUM BLD-MCNC: 7.5 MG/DL (ref 8.5–10.1)
CHLORIDE SERPL-SCNC: 107 MMOL/L (ref 98–112)
CO2 SERPL-SCNC: 24 MMOL/L (ref 21–32)
CREAT BLD-MCNC: 0.37 MG/DL
EGFRCR SERPLBLD CKD-EPI 2021: 145 ML/MIN/1.73M2 (ref 60–?)
EOSINOPHIL # BLD AUTO: 0.02 X10(3) UL (ref 0–0.7)
EOSINOPHIL NFR BLD AUTO: 0.3 %
ERYTHROCYTE [DISTWIDTH] IN BLOOD BY AUTOMATED COUNT: 12.8 %
GLOBULIN PLAS-MCNC: 2.7 G/DL (ref 2.8–4.4)
GLUCOSE BLD-MCNC: 104 MG/DL (ref 70–99)
GLUCOSE BLD-MCNC: 113 MG/DL (ref 70–99)
GLUCOSE BLD-MCNC: 134 MG/DL (ref 70–99)
GLUCOSE BLD-MCNC: 138 MG/DL (ref 70–99)
GLUCOSE BLD-MCNC: 139 MG/DL (ref 70–99)
GLUCOSE BLD-MCNC: 179 MG/DL (ref 70–99)
HCT VFR BLD AUTO: 32.8 %
HGB BLD-MCNC: 11.2 G/DL
IMM GRANULOCYTES # BLD AUTO: 0.06 X10(3) UL (ref 0–1)
IMM GRANULOCYTES NFR BLD: 0.8 %
LYMPHOCYTES # BLD AUTO: 0.5 X10(3) UL (ref 1–4)
LYMPHOCYTES NFR BLD AUTO: 6.8 %
MCH RBC QN AUTO: 33.1 PG (ref 26–34)
MCHC RBC AUTO-ENTMCNC: 34.1 G/DL (ref 31–37)
MCV RBC AUTO: 97 FL
MONOCYTES # BLD AUTO: 0.84 X10(3) UL (ref 0.1–1)
MONOCYTES NFR BLD AUTO: 11.4 %
NEUTROPHILS # BLD AUTO: 5.92 X10 (3) UL (ref 1.5–7.7)
NEUTROPHILS # BLD AUTO: 5.92 X10(3) UL (ref 1.5–7.7)
NEUTROPHILS NFR BLD AUTO: 80.2 %
OSMOLALITY SERPL CALC.SUM OF ELEC: 281 MOSM/KG (ref 275–295)
PHOSPHATE SERPL-MCNC: 2.6 MG/DL (ref 2.5–4.9)
PLATELET # BLD AUTO: 123 10(3)UL (ref 150–450)
POTASSIUM SERPL-SCNC: 3.9 MMOL/L (ref 3.5–5.1)
PROT SERPL-MCNC: 5.4 G/DL (ref 6.4–8.2)
RBC # BLD AUTO: 3.38 X10(6)UL
SODIUM SERPL-SCNC: 136 MMOL/L (ref 136–145)
WBC # BLD AUTO: 7.4 X10(3) UL (ref 4–11)

## 2024-03-15 PROCEDURE — 99233 SBSQ HOSP IP/OBS HIGH 50: CPT | Performed by: HOSPITALIST

## 2024-03-15 PROCEDURE — 99233 SBSQ HOSP IP/OBS HIGH 50: CPT | Performed by: INTERNAL MEDICINE

## 2024-03-15 PROCEDURE — 70450 CT HEAD/BRAIN W/O DYE: CPT | Performed by: INTERNAL MEDICINE

## 2024-03-15 RX ORDER — LORAZEPAM 2 MG/ML
4 INJECTION INTRAMUSCULAR EVERY 30 MIN PRN
Status: DISCONTINUED | OUTPATIENT
Start: 2024-03-15 | End: 2024-03-18

## 2024-03-15 RX ORDER — LORAZEPAM 2 MG/ML
3 INJECTION INTRAMUSCULAR
Status: DISCONTINUED | OUTPATIENT
Start: 2024-03-15 | End: 2024-03-18

## 2024-03-15 RX ORDER — LORAZEPAM 1 MG/1
1 TABLET ORAL EVERY 4 HOURS PRN
Status: DISCONTINUED | OUTPATIENT
Start: 2024-03-15 | End: 2024-03-18

## 2024-03-15 RX ORDER — LORAZEPAM 2 MG/1
2 TABLET ORAL EVERY 2 HOUR PRN
Status: DISCONTINUED | OUTPATIENT
Start: 2024-03-15 | End: 2024-03-18

## 2024-03-15 RX ORDER — LORAZEPAM 2 MG/ML
1 INJECTION INTRAMUSCULAR EVERY 4 HOURS PRN
Status: DISCONTINUED | OUTPATIENT
Start: 2024-03-15 | End: 2024-03-18

## 2024-03-15 RX ORDER — LORAZEPAM 2 MG/ML
5 INJECTION INTRAMUSCULAR
Status: DISCONTINUED | OUTPATIENT
Start: 2024-03-15 | End: 2024-03-18

## 2024-03-15 RX ORDER — LORAZEPAM 2 MG/ML
2 INJECTION INTRAMUSCULAR EVERY 2 HOUR PRN
Status: DISCONTINUED | OUTPATIENT
Start: 2024-03-15 | End: 2024-03-18

## 2024-03-15 RX ORDER — DOXEPIN HYDROCHLORIDE 50 MG/1
1 CAPSULE ORAL DAILY
Status: DISCONTINUED | OUTPATIENT
Start: 2024-03-16 | End: 2024-03-18

## 2024-03-15 RX ORDER — LORAZEPAM 2 MG/ML
6 INJECTION INTRAMUSCULAR EVERY 10 MIN PRN
Status: DISCONTINUED | OUTPATIENT
Start: 2024-03-15 | End: 2024-03-18

## 2024-03-15 NOTE — PLAN OF CARE
1830: Patient c/o double vision. Pupils unequal. Patient A&O with no other neuro deficits. Paged intensivist and awaiting response/orders at time of note.

## 2024-03-15 NOTE — PROGRESS NOTES
03/15/24 1001   Vent Information   $ RT Standby Charge (per 15 min) 1   Vent Initiation Date 03/13/24   Ventilation Day(s) 3   Interface Invasive   Vent Type    Vent plugged into main power? Yes   Vent -8   Vent Mode PS   Settings   FiO2 (%) 30 %   PEEP/CPAP (cm H2O) 5 cm H20   Press Support CWP 5   Readings   Total RR 13   Minute Ventilation (L/min) 9 L/min   Vt Spontaneous (mL) 983 mL   PIP Observed (cm H2O) 10 cm H2O   MAP (cm H2O) 7.6   I/E Ratio 1:3.8   Alarms   High RR 40   Insp Pressure High (cm H2O) 40 cm H2O   Insp Pressure Low (cm H2O) 9 cm H2O   MV High (L/min) 20 L/min   MV Low (L/min) 2 L/min   Apnea Interval (sec) 20 seconds   Apnea Rate 12   Apnea Volume (mL) 500 mL   Spontaneous Breathing Trial   Spontaneous Breathing Trial Complete Y   Is the FiO2 <= 0.5? (titrated for sats 92-94%) Y   Is the PEEP <= 5? Y   Is the RSBI <=104 Y   Is the patient off pressor and narcotic / sedation drips? Y   Is the patient free of ventricular arrhythmias in the past 24 hours? Y   Is the patient's cough adequate? Y   Is the patient alert (neuro), able to follow commands? Y   Daily Screen Meets All Criteria No   Spontaneous Parameters   Spontaneous RR Rate 13   Spontaneous Minute Volume 9   Average Spontaneous Tidal Volume 983   $ Spontaneous Vital Capacity   (Unable pt went apneic X 2 4 mins into SBT RN Aware lowering sedation.)   Negative Inspiratory Force   (Unable pt went apneic X 2 4 mins into SBT RN Aware lowering sedation.)   Total RSBI 13   Weaning Trials   Patient self-extubated? No   Compassionate wean? No   Spontaneous Breathing Trial Method ps   Spontaneous Breathing Trial Settings ps5/5/30%   Pre Trial HR 89   Pre Trial RR 15   Pre Trial SpO2 100 %   Pre Trial /87   Pre Trial Vt 506   ETT   Placement Date/Time: 03/13/24 (c) 0012   Airway Size: 8 mm  Cuffed: Cuffed  Insertion attempts: 1  Technique: Video laryngoscopy  Placement Verification: Capnometry  Placed By: Anesthesiologist    Secured at (cm) 28 cm   Suctioned? N   Measured From Lips   Secured Location Center   Secured by Commercial tube pendleton   Site Condition Dry   Req'd equipment at bedside Bag mask     Pt went apneic X2 4 mins into SBT RN Aware lowering sedation. Will attempt again when pt is more awake.     1110 Pt self extubated bilaterally clr/dim no stridor resting comfortably on RA. RN and Dr Ryan and at bedside.

## 2024-03-15 NOTE — PROGRESS NOTES
[facility] Pharmacy Note:  Discontinuation of Stress Ulcer Prophylaxis     Philip Castellano is a 38 year old patient who was initiated on stress ulcer prophylaxis with pantoprazole (PROTONIX).    The patient no longer meets criteria for stress ulcer prophylaxis.  It has been discontinued per the pharmacy consult.    Thank you,  Shilpa Smith, PharmD  3/15/2024 12:57 PM

## 2024-03-15 NOTE — PROGRESS NOTES
ICU  Critical Care APRN Progress Note    NAME: Philip Castellano - ROOM: 459/459-A - MRN: [id number] - Age: 38 year old - :1985    Subjective:  Patient self-extubated this morning. Oriented to name only. Complains of sore throat and little difficulty swallowing, tongue remains swollen    OBJECTIVE  Vitals:  /73   Pulse 83   Temp 97.1 °F (36.2 °C) (Temporal)   Resp 12   Ht 185.4 cm (6' 1\")   Wt 140 lb 6.9 oz (63.7 kg)   SpO2 100%   BMI 18.53 kg/m²              O2: self extubated, NC    Physical Exam:    General Appearance:awake, calm, confused  HEENT: No JVD, tongue swollen and ecchymotic  Lungs: Clear to auscultation bilaterally, respirations unlabored  Heart: Regular rate and rhythm, S1 and S2 normal, no murmur, rub or gallop  Abdomen: Soft, non-tender, bowel sounds active all four quadrants, no masses, no organomegaly  Extremities: Extremities normal, atraumatic, no cyanosis or edema,capillary refill <3 sec.    Pulses: 2+ and symmetric all extremities  Skin: Skin color, texture, turgor normal for ethnicity, no rashes or lesions, warm and dry  Neurologic: awake, oriented to self only, cooperative after self extubating    Data this admission:  XR CHEST AP PORTABLE  (CPT=71045)    Result Date: 3/13/2024  CONCLUSION:  1. Placement of ET tube and NG tube when compared to 3/11/2024 examination. 2. Radiopaque body projects over the GE junction, correlate clinically. 3. Slight elevation of the right hemidiaphragm with development of right lower lobe retrocardiac opacity may represent atelectasis, infection or aspiration cannot be excluded, correlate clinically.  Clinical service notified of these findings with preliminary radiology report from [facility] services.    LOCATION:  [facility]      Dictated by (CST): Anya Marquez MD on 3/13/2024 at 7:38 AM     Finalized by (CST): Anya Marquez MD on 3/13/2024 at 7:41 AM       CT ANGIOGRAPHY, CHEST (CPT=71275)    Result Date: 3/13/2024  CONCLUSION:  1. No CT evidence for  pulmonary embolism. 2. Right lower lobe partial atelectasis with endobronchial fluid/debris, consider aspiration.  Recommend follow-up to ensure resolution and to exclude a perihilar mass.  Clinical service notified of these findings with preliminary radiology report from [facility] services.     LOCATION:  [facility]   Dictated by (CST): Anya Marquez MD on 3/13/2024 at 7:10 AM     Finalized by (CST): Anya Marquez MD on 3/13/2024 at 7:16 AM       CT BRAIN OR HEAD (14896)    Result Date: 3/13/2024  CONCLUSION:  No acute intracranial hemorrhage.  Clinical service notified of these findings with preliminary radiology report from [facility] services.   LOCATION:  [facility]   Dictated by (CST): Anya Marquez MD on 3/13/2024 at 6:24 AM     Finalized by (CST): Anya Marquez MD on 3/13/2024 at 6:25 AM       XR CHEST AP PORTABLE  (CPT=71045)    Result Date: 3/11/2024  PROCEDURE:  XR CHEST AP PORTABLE  (CPT=71045)  TECHNIQUE:  AP chest radiograph was obtained.  COMPARISON:  None.  INDICATIONS:  mom heard thud, heavy ETOH use. Left hematoma, neck lac. Found in tub 238 level 2 trauma called. was coming around for EMS, seizing on arrival.  PATIENT STATED HISTORY: (As transcribed by Technologist)  Pt.   mom heard thud, heavy ETOH use. Left hematoma, neck lac. Found in tub 238 level 2 trauma called. was coming around for EMS, seizing on arrival.   FINDINGS: Cardiac silhouette and pulmonary vasculature are unremarkable. No consolidation, pleural effusion or pneumothorax.  Prominence of the right hilum is noted. IMPRESSION: No consolidation.  Prominence of the right hilum is noted.  A CT chest examination with contrast evaluate this region to exclude a mass is recommended.   LOCATION:  [facility]      Dictated by (CST): Agustin Rapp MD on 3/11/2024 at 5:53 PM     Finalized by (CST): Agustin Rapp MD on 3/11/2024 at 5:54 PM       CT ABDOMEN+PELVIS(CONTRAST ONLY)(CPT=74177)    Result Date: 3/11/2024  CONCLUSION:  1. Nonspecific fat stranding  surrounding the ascending colon and cecum.  Findings may be related to nonspecific colitis. 2. Severe diffuse hepatic steatosis.   LOCATION:  [id number]   Dictated by (CST): Sadiq Gordon MD on 3/11/2024 at 5:08 PM     Finalized by (CST): Sadiq Gordon MD on 3/11/2024 at 5:11 PM       CT BRAIN OR HEAD (41006)    Result Date: 3/11/2024  CONCLUSION:   1. Negative for acute intracranial process.  2. Right frontal parietal scalp laceration and hematoma without calvarial fracture.    LOCATION:  [id number]   Dictated by (CST): Deandre Villalta MD on 3/11/2024 at 3:32 PM     Finalized by (CST): Deandre Villalta MD on 3/11/2024 at 3:34 PM       CT SPINE CERVICAL (CPT=72125)    Result Date: 3/11/2024  CONCLUSION:   1. No evidence of acute traumatic injury of the cervical spine.  2. Disc and endplate degenerative changes at C5-6 resulting in mild central canal stenosis and moderate left-sided foraminal stenosis.     LOCATION:  [id number]   Dictated by (CST): Deandre Villalta MD on 3/11/2024 at 3:08 PM     Finalized by (CST): Deandre Villalta MD on 3/11/2024 at 3:12 PM         Labs:  Lab Results   Component Value Date    WBC 7.4 03/15/2024    HGB 11.2 03/15/2024    HCT 32.8 03/15/2024    .0 03/15/2024    CREATSERUM 0.37 03/15/2024    BUN 3 03/15/2024     03/15/2024    K 3.9 03/15/2024     03/15/2024    CO2 24.0 03/15/2024     03/15/2024    CA 7.5 03/15/2024    ALB 2.7 03/15/2024    ALKPHO 51 03/15/2024    BILT 0.6 03/15/2024    TP 5.4 03/15/2024    AST 31 03/15/2024    ALT 25 03/15/2024    PHOS 2.6 03/15/2024       Assessment/Plan:      ETOH withdrawal/Delirium Tremens  - Ethyl alcohol 151 on admission 3/11  - [facility] protocol  - MVI/thiamine/folic acid  - PRN ativan  - PRN haldol  - Scheduled phenobarb-weaning protocol  - Restraints PRN  - Psych consulted    Acute resp failure- due to inability to protect airway when on heavy sedation as well as tongue laceration/trauma.  CTA chest also suggestive of RLL PNA c/w aspiration PNA  -  intubated; wean FiO2 as tolerated  - SBT when sedation needs improve. Will need to check cuff leak prior to extubation  - Decadron for lingual swelling likely from trauma during seizure  - Continue Zosyn (3/13-)    Seizures - Likely 2/2 to etoh withdrawal  - Keppra x1 in ED  - EEG without evidence of seizures  - CT brain/spine negative for acute intracranial process  - Seizure precautions  - CK minimally elevated, not c/w rhabdo  - Neuro signed off- no further work up at this time      Scalp laceration/hematoma  - CT brain/spine unremarkable  - Trauma surgery evaluated, now signed off  - Monitor     Cdiff  - Cdiff positive, EIA negative  - CT abd with possible colitis  - PO vanc  -Contact plus isolation     Thrombocytopenia: Likely 2/2 to etoh use  - Plts improving  - No s/s of bleeding  - Monitor daily CBC    Abnormal CT chest- due to asp PNA, mucous plugging atelectasis  - may benefit from repeat CT scan as OP to ensure resolution    F/E/N  - Replete electrolytes per protocol  - TF     Proph   -SCDs for DVT prophylaxis. Chemical proph relatively contraindicated bc of TCP and recent trauma to head.     Dispo - Full code  - ICU monitoring    Plan of care discussed with intensivist Dr. Connor Cartwright, [facility]  ICU  Phone  [phone]   Pager [phone]      Intensivist addendum:   I agree with APN note above. I have independently seen and evaluated the patient. I agree with the management plan outlined above. Patient self extubated. Currently stable. Wean phenobarbital. Continue Decadron for tongue swelling.    Mumtaz Ryan MD  Pulmonary and Critical Care Medicine

## 2024-03-15 NOTE — DIETARY NOTE
[facility]   part of [facility]  NUTRITION ASSESSMENT    Unable to diagnose malnutrition criteria at this time.    NUTRITION INTERVENTION:    Meal and Snacks - ADAT once extubated.  Enteral Nutrition - Via NGT, while propofol infusing at current rate, decrease TF to GOAL: Vital AF at 45 ml/hr + 1 Prosource TF Free packet/day.   This will provide 1386 kcal, 96 grams protein, 875 ml total free water, and 86% of RDI's.   Recommend 80 ml water flush q 4 hours, TF+FWF provides 1355 ml total fluids.   Coordination of Nutrition Care - Recommend SLP consult prior to diet advancement.    PATIENT STATUS: 3/15: Pt remains intubated, sedated on propofol, tolerating TF at goal via NGT. Pt with tongue swelling (likely from biting it during seizure) and started on steroids. Possible SBT and extubation today. Will continue to monitor and follow up as appropriate.    3/13: 38 year old male admitted on 3/11 presents s/p fall from alcohol intoxication. RRT called yesterday d/t increased agitation and high CIWA; transferred to ICU and intubated overnight for airway protection. Pt screened d/t consult for TF recs. Pt is currently intubated, sedated on propofol, NPO with NGT to LIS. No plan to extubate today. Noted K+, Mg and Phos all low - concern for refeeding syndrome. Folic acid and thiamine IV already ordered and being given. Will provide TF recs above and advance slowly. Recommend aggressive electrolytes replacement protocol.    PMH:  has a past medical history of Alcoholism (HCC) and Clavicle fracture.    ANTHROPOMETRICS:  Ht:  6'1\"  Wt: 63.7 kg (140 lb 6.9 oz).   BMI: Body mass index is 18.53 kg/m².  IBW: 83.6 kg    WEIGHT HISTORY:   Patient Weight(s) for the past 336 hrs:   Weight   03/15/24 0400 63.7 kg (140 lb 6.9 oz)   03/14/24 0500 62.1 kg (136 lb 14.5 oz)   03/13/24 0545 59.1 kg (130 lb 4.7 oz)   03/12/24 2140 57.9 kg (127 lb 10.3 oz)   03/11/24 2214 72.6 kg (160 lb)       Wt Readings from Last 10 Encounters:    03/15/24 63.7 kg (140 lb 6.9 oz)   05/12/20 72.6 kg (160 lb)   01/16/19 68 kg (150 lb)   01/15/19 67.6 kg (149 lb 0.5 oz)   09/12/18 67.6 kg (149 lb)   06/27/18 70.8 kg (156 lb)        NUTRITION:  Diet:   No orders of the defined types were placed in this encounter.       Percent Meals Eaten (last 3 days)       None            Food Allergies: No  Cultural/Ethnic/Orthodox Preferences Addressed: Yes    GI SYSTEM REVIEW: WNL; last BM 3/13  Skin/Wounds: WNL    NUTRITION RELATED PHYSICAL FINDINGS:     1. Body Fat/Muscle Mass:  YAHAIRA     2. Fluid Accumulation: none per RN documentation     NUTRITION PRESCRIPTION: 63.7 kg (3/15)  Calories: 1687 calories/day ([place]; MV:5.9 L/min, Temp:37 C)  Protein:  grams protein/day (1.5-2.0 gm/kg)  Fluid: ~1 ml/kcal or per MD discretion    NUTRITION DIAGNOSIS/PROBLEM:  Inadequate oral intake related to respiratory process or complication of therapy which results in mechanical ventilation as evidenced by need for NPO status and TF to meet nutrition needs.    MONITOR AND EVALUATE/NUTRITION GOALS:  Weight stable within 1 to 2 lbs during admission - Ongoing  Start alternative nutrition in 24-48 hrs if diet is not able to advance- Resolved  Tolerate alternative nutrition at 100% of goal - New      MEDICATIONS:  Decadron, Folic acid 1 mg, SSI aspart, multivitamin, protonix, abx, Kphos 500 mg TID, thiamine 100 mg  Gtt: precedex, fentanyl, propofol at 13.1 ml/hr (provides ~346 kcal/day)    LABS:  , K+ 3.9, Phos 2.6, POC glucose: 102-151 mg/dl    Pt is at High nutrition risk    Janel Loco RD, LDN, [facility]  Clinical Dietitian  Spectra: [phone]

## 2024-03-15 NOTE — CM/SW NOTE
Patient currently intubated and requiring ventilator support in the ICU, Care Coordination to follow for care planning.      Laurel Lopez RN Case Manager [phone]

## 2024-03-15 NOTE — PROGRESS NOTES
03/15/24 0824   Vent Information   $ RT Standby Charge (per 15 min) 1   Vent Initiation Date 03/13/24   Ventilation Day(s) 3   Interface Invasive   Vent Type    Vent plugged into main power? Yes   Vent -8   Vent Mode VC+   Settings   FiO2 (%) 30 %   Resp Rate (Set) 12   Vt (Set, mL) 500 mL   Waveform Decelerating ramp   PEEP/CPAP (cm H2O) 5 cm H20   Insp Time (sec) 0.8 sec   Insp Rise Time (%) 50 %   Trigger Sensitivity Flow (L/min) 3 L/min   Humidification Heater   H2O Bag Level 1/4 Full   Heater Temperature 98.4 °F (36.9 °C)   Readings   Total RR 12   Minute Ventilation (L/min) 5.5 L/min   Inspiratory Tidal Volume 500 mL   Expiratory Tidal Volume 529 mL   PIP Observed (cm H2O) 12 cm H2O   MAP (cm H2O) 6.7   I/E Ratio 1:5.3   Plateau Pressure (cm H2O) 12 cm H2O   Alarms   High RR 40   Insp Pressure High (cm H2O) 40 cm H2O   Insp Pressure Low (cm H2O) 9 cm H2O   MV High (L/min) 20 L/min   MV Low (L/min) 2 L/min   Apnea Interval (sec) 20 seconds   Apnea Rate 12   Apnea Volume (mL) 500 mL   ETT   Placement Date/Time: 03/13/24 (c) 0012   Airway Size: 8 mm  Cuffed: Cuffed  Insertion attempts: 1  Technique: Video laryngoscopy  Placement Verification: Capnometry  Placed By: Anesthesiologist   Secured at (cm) 28 cm   Suctioned? N   Measured From Lips   Secured Location Center   Secured by Commercial tube pendleton   Site Condition Dry   Req'd equipment at bedside Bag mask     RT received pt on above vent settings ETT 8.0 28 Lip Right side. Pt is bilaterally clr/dim. Possible wean trial today monitoring ongoing.

## 2024-03-15 NOTE — PROGRESS NOTES
03/15/24 0440   Vent Information   $ RT Standby Charge (per 15 min) 1   Vent Initiation Date 03/13/24   Ventilation Day(s) 3   Interface Invasive   Vent Type    Vent plugged into main power? Yes   Vent -8   Vent Mode VC+   Settings   FiO2 (%) 30 %   Resp Rate (Set) 12   Vt (Set, mL) 500 mL   Waveform Decelerating ramp   PEEP/CPAP (cm H2O) 5 cm H20   Insp Time (sec) 0.8 sec   Insp Rise Time (%) 50 %   Trigger Sensitivity Flow (L/min) 3 L/min   Humidification Heater   H2O Bag Level 1/2 Full   Heater Temperature 98.6 °F (37 °C)   Readings   Total RR 12   Minute Ventilation (L/min) 5.9 L/min   Expiratory Tidal Volume 491 mL   PIP Observed (cm H2O) 14 cm H2O   MAP (cm H2O) 7   I/E Ratio 1:5.3

## 2024-03-15 NOTE — PLAN OF CARE
Received patient following report. Patient orally intubated to ventilator. 30%/+5. O2 saturations adequate. Sedated with propofol, precedex and fentanyl. Tongue swelling seems much improved compared to yesterday.When sedation lowered patient able to follow commands with all extremities but becomes agitated, attempting to sit up in bed, sedation optimized. NSR on tele. BP stable. NG to TF at goal. Voiding via external cath. No BM. See flowsheets and MAR.

## 2024-03-15 NOTE — PROGRESS NOTES
[facility]   part of [facility]     Hospitalist Progress Note     Philip Castellano Patient Status:  Inpatient    1985 MRN [id number]   Location [facility] 3NE-A Attending Guero Sepulveda, DO   Hosp Day # 4 PCP Tish Guzman     Chief Complaint: seizure activity    Subjective:     Pt remains intubated on precedex + propofol + fentanyl.     Objective:    Review of Systems:   A comprehensive review of systems was completed; pertinent positive and negatives stated in subjective.    Vital signs:  Temp:  [97 °F (36.1 °C)-97.7 °F (36.5 °C)] 97.2 °F (36.2 °C)  Pulse:  [65-89] 89  Resp:  [11-17] 15  BP: (105-130)/() 112/88  SpO2:  [95 %-100 %] 100 %  FiO2 (%):  [30 %-40 %] 30 %    Physical Exam:    General: No acute distress, sedated on ventilator   Respiratory: mechanical breath sounds present  Cardiovascular: S1, S2, regular rate and rhythm  Abdomen: Soft, Non-tender, non-distended, positive bowel sounds  Neuro: No new focal deficits.   Extremities: No edema      Diagnostic Data:    Labs:  Recent Labs   Lab 24  1443 24  0555 24  0429 24  0430 03/15/24  0405   WBC 6.7 5.9 5.0 4.4 7.4   HGB 13.2 11.0* 10.8* 10.7* 11.2*   MCV 98.0 95.5 97.2 96.5 97.0   PLT 96.0* 60.0* 63.0* 71.0* 123.0*       Recent Labs   Lab 24  0429 24  1724 24  0430 24  1514 03/15/24  0405   GLU 69*  --  111*  --  134*   BUN 5*  --  3*  --  3*   CREATSERUM 0.39*  --  0.31*  --  0.37*   CA 7.7*  --  7.2*  --  7.5*   ALB 3.4  --  2.9*  --  2.7*     --  138  --  136   K 2.8*   < > 3.0*  3.0* 4.2 3.9     --  108  --  107   CO2 21.0  --  23.0  --  24.0   ALKPHO 47  --  44*  --  51   AST 86*  --  66*  --  31   ALT 31  --  30  --  25   BILT 1.4  --  1.2  --  0.6   TP 5.8*  --  5.5*  --  5.4*    < > = values in this interval not displayed.       Estimated Creatinine Clearance: 239.1 mL/min (A) (based on SCr of 0.37 mg/dL (L)).    Recent Labs   Lab 24  0033   *        No results for input(s): \"PTP\", \"INR\" in the last 168 hours.               Microbiology    Hospital Encounter on 03/11/24   1. Blood Culture     Status: None (Preliminary result)    Collection Time: 03/13/24  4:29 AM    Specimen: Blood,peripheral   Result Value Ref Range    Blood Culture Result No Growth 2 Days N/A         Imaging: Reviewed in Epic.    Medications:    dexamethasone  4 mg Intravenous Q6H    potassium phosphate monobasic  500 mg Per NG Tube TID    insulin aspart  1-5 Units Subcutaneous 4 times per day    chlorhexidine gluconate  15 mL Mouth/Throat BID@0800,2000    pantoprazole  40 mg Intravenous QAM AC    vancomycin  125 mg Per NG Tube 4x daily    piperacillin-tazobactam  3.375 g Intravenous Q8H    thiamine  100 mg Oral Daily    folic acid  1 mg Oral Daily    PHENObarbital  65 mg Intravenous Q8H    Followed by    [START ON 3/16/2024] PHENObarbital  32.5 mg Intravenous Q8H    multivitamin  1 tablet Oral Daily       Assessment & Plan:      #Acute hypoxemic resp failure  #Aspiration pneumonia  -intubated 3/12 for airway protection  -sedated w/ precedex + fentanyl + propofol gtt + scheduled phenobarbital; discussed case w/ pulm/crit care attending, plan to wean sedation for SBT  -iv zosyn (3/13-)    #Seizures  -suspect ETOH withdrawal seizures  -given keppra in ER; since dc'ed by neuro  -EEG reviewed  -neuro eval noted and signed off  -CT brain negative     #Cdiff  -po vanco (3/13-)    #Tongue laceration   #Inflammation in oral cavity  -decadron     #Alcohol withdrawals  #Delirium tremons  -CIWA protocol w/ scheduled phenobarbital   -cont MVI, thiamine, folic acid     #Hyponatremia, improving   -trend labs, cont ivf    #Elevated AST/ALT, improving   -due to ETOH abuse  -trend     #Facial / nose laceration and Partial hematoma         Guero Sepulveda, DO    Supplementary Documentation:     Quality:  DVT Mechanical Prophylaxis:   SCDs,    DVT Pharmacologic Prophylaxis   Medication   None                 Code Status: Full Code  Stanley: External urinary catheter in place  Stanley Duration (in days):   Central line:    JAZIEL:     Discharge is dependent on: clinical improvement  At this point Mr. Castellano is expected to be discharge to: tbd    The 21st Century Cures Act makes medical notes like these available to patients in the interest of transparency. Please be advised this is a medical document. Medical documents are intended to carry relevant information, facts as evident, and the clinical opinion of the practitioner. The medical note is intended as peer to peer communication and may appear blunt or direct. It is written in medical language and may contain abbreviations or verbiage that are unfamiliar.

## 2024-03-16 LAB
ALBUMIN SERPL-MCNC: 3.1 G/DL (ref 3.4–5)
ALBUMIN/GLOB SERPL: 0.9 {RATIO} (ref 1–2)
ALP LIVER SERPL-CCNC: 68 U/L
ALT SERPL-CCNC: 41 U/L
ANION GAP SERPL CALC-SCNC: 5 MMOL/L (ref 0–18)
AST SERPL-CCNC: 72 U/L (ref 15–37)
BASOPHILS # BLD AUTO: 0.05 X10(3) UL (ref 0–0.2)
BASOPHILS NFR BLD AUTO: 0.6 %
BILIRUB SERPL-MCNC: 0.7 MG/DL (ref 0.1–2)
BUN BLD-MCNC: 6 MG/DL (ref 9–23)
CALCIUM BLD-MCNC: 8.3 MG/DL (ref 8.5–10.1)
CHLORIDE SERPL-SCNC: 106 MMOL/L (ref 98–112)
CO2 SERPL-SCNC: 22 MMOL/L (ref 21–32)
CREAT BLD-MCNC: 0.52 MG/DL
EGFRCR SERPLBLD CKD-EPI 2021: 131 ML/MIN/1.73M2 (ref 60–?)
EOSINOPHIL # BLD AUTO: 0.01 X10(3) UL (ref 0–0.7)
EOSINOPHIL NFR BLD AUTO: 0.1 %
ERYTHROCYTE [DISTWIDTH] IN BLOOD BY AUTOMATED COUNT: 13.2 %
GLOBULIN PLAS-MCNC: 3.3 G/DL (ref 2.8–4.4)
GLUCOSE BLD-MCNC: 100 MG/DL (ref 70–99)
GLUCOSE BLD-MCNC: 86 MG/DL (ref 70–99)
GLUCOSE BLD-MCNC: 88 MG/DL (ref 70–99)
GLUCOSE BLD-MCNC: 93 MG/DL (ref 70–99)
GLUCOSE BLD-MCNC: 98 MG/DL (ref 70–99)
GLUCOSE BLD-MCNC: 98 MG/DL (ref 70–99)
HCT VFR BLD AUTO: 32.4 %
HGB BLD-MCNC: 11.6 G/DL
IMM GRANULOCYTES # BLD AUTO: 0.09 X10(3) UL (ref 0–1)
IMM GRANULOCYTES NFR BLD: 1 %
LYMPHOCYTES # BLD AUTO: 0.76 X10(3) UL (ref 1–4)
LYMPHOCYTES NFR BLD AUTO: 8.8 %
MCH RBC QN AUTO: 34.6 PG (ref 26–34)
MCHC RBC AUTO-ENTMCNC: 35.8 G/DL (ref 31–37)
MCV RBC AUTO: 96.7 FL
MONOCYTES # BLD AUTO: 1.12 X10(3) UL (ref 0.1–1)
MONOCYTES NFR BLD AUTO: 12.9 %
NEUTROPHILS # BLD AUTO: 6.62 X10 (3) UL (ref 1.5–7.7)
NEUTROPHILS # BLD AUTO: 6.62 X10(3) UL (ref 1.5–7.7)
NEUTROPHILS NFR BLD AUTO: 76.6 %
OSMOLALITY SERPL CALC.SUM OF ELEC: 274 MOSM/KG (ref 275–295)
PLATELET # BLD AUTO: 208 10(3)UL (ref 150–450)
POTASSIUM SERPL-SCNC: 3.6 MMOL/L (ref 3.5–5.1)
PROT SERPL-MCNC: 6.4 G/DL (ref 6.4–8.2)
RBC # BLD AUTO: 3.35 X10(6)UL
SODIUM SERPL-SCNC: 133 MMOL/L (ref 136–145)
WBC # BLD AUTO: 8.7 X10(3) UL (ref 4–11)

## 2024-03-16 PROCEDURE — 99233 SBSQ HOSP IP/OBS HIGH 50: CPT | Performed by: INTERNAL MEDICINE

## 2024-03-16 PROCEDURE — 99233 SBSQ HOSP IP/OBS HIGH 50: CPT | Performed by: HOSPITALIST

## 2024-03-16 RX ORDER — HYDRALAZINE HYDROCHLORIDE 20 MG/ML
10 INJECTION INTRAMUSCULAR; INTRAVENOUS EVERY 4 HOURS PRN
Status: DISCONTINUED | OUTPATIENT
Start: 2024-03-16 | End: 2024-03-19

## 2024-03-16 RX ORDER — VANCOMYCIN HYDROCHLORIDE 125 MG/1
125 CAPSULE ORAL 4 TIMES DAILY
Status: DISCONTINUED | OUTPATIENT
Start: 2024-03-16 | End: 2024-03-19

## 2024-03-16 NOTE — PLAN OF CARE
Received pt A&Ox3 on RA. Disoriented to time, reorientation provided. Male external catheter intact, monitoring output. Up to bathroom with standby assist, multiple loose stools. Tmax: 100.7F; PRN tylenol given per order. VSS. NSR/ST on monitor. See flowsheets. See MAR. Has transfer orders.

## 2024-03-16 NOTE — PROGRESS NOTES
[facility]   part of [place] Hospitalist Progress Note     Philip Castellano Patient Status:  Inpatient    1985 MRN [id number]   Location [facility] 3NE-A Attending Guero Sepulveda,    Hosp Day # 5 PCP Tish Guzman     Chief Complaint: seizure activity    Subjective:     Patient complaining of some double vision.  No focal deficits.  No headache.    Objective:    Review of Systems:   A comprehensive review of systems was completed; pertinent positive and negatives stated in subjective.    Vital signs:  Temp:  [99.6 °F (37.6 °C)-100.7 °F (38.2 °C)] 99.9 °F (37.7 °C)  Pulse:  [] 97  Resp:  [10-23] 22  BP: (105-157)/() 147/98  SpO2:  [98 %-100 %] 98 %    Physical Exam:    General: No acute distress  Respiratory: Clear to auscultation no wheezes or rales  Cardiovascular: S1, S2, regular rate and rhythm  Abdomen: Soft, Non-tender, non-distended, positive bowel sounds  Neuro: No new focal deficits.   Extremities: No edema      Diagnostic Data:    Labs:  Recent Labs   Lab 24  0555 24  0429 24  0430 03/15/24  0405 24  0924   WBC 5.9 5.0 4.4 7.4 8.7   HGB 11.0* 10.8* 10.7* 11.2* 11.6*   MCV 95.5 97.2 96.5 97.0 96.7   PLT 60.0* 63.0* 71.0* 123.0* 208.0       Recent Labs   Lab 24  0430 24  1514 03/15/24  0405 24  0534   *  --  134* 100*   BUN 3*  --  3* 6*   CREATSERUM 0.31*  --  0.37* 0.52*   CA 7.2*  --  7.5* 8.3*   ALB 2.9*  --  2.7* 3.1*     --  136 133*   K 3.0*  3.0* 4.2 3.9 3.6     --  107 106   CO2 23.0  --  24.0 22.0   ALKPHO 44*  --  51 68   AST 66*  --  31 72*   ALT 30  --  25 41   BILT 1.2  --  0.6 0.7   TP 5.5*  --  5.4* 6.4       Estimated Creatinine Clearance: 175.7 mL/min (A) (based on SCr of 0.52 mg/dL (L)).    Recent Labs   Lab 24  0033   *       No results for input(s): \"PTP\", \"INR\" in the last 168 hours.               Microbiology    Hospital Encounter on 24   1. Blood Culture     Status:  None (Preliminary result)    Collection Time: 03/13/24  4:29 AM    Specimen: Blood,peripheral   Result Value Ref Range    Blood Culture Result No Growth 3 Days N/A         Imaging: Reviewed in Epic.    Medications:    multivitamin  1 tablet Oral Daily    potassium phosphate monobasic  500 mg Per NG Tube TID    insulin aspart  1-5 Units Subcutaneous 4 times per day    vancomycin  125 mg Per NG Tube 4x daily    piperacillin-tazobactam  3.375 g Intravenous Q8H    thiamine  100 mg Oral Daily    folic acid  1 mg Oral Daily    PHENObarbital  32.5 mg Intravenous Q8H       Assessment & Plan:      #Alcohol withdrawals  #Delirium tremons  -CIWA protocol w/ scheduled phenobarbital, taper  -cont MVI, thiamine, folic acid    #Acute hypoxemic resp failure  #Aspiration pneumonia  -intubated 3/12 for airway protection  s/p extubation 3/15  -S/p sedation w/ precedex + fentanyl + propofol gtt   -iv zosyn (3/13-)    #Seizures  -suspect ETOH withdrawal seizures  -given keppra in ER; since dc'ed by neuro  -EEG reviewed  -neuro eval noted and signed off  -CT brain negative     #Cdiff  -po vanco (3/13-)    #Tongue laceration   #Inflammation in oral cavity  -S/p decadron     #Double vision  -MRI brain ordered     #Hyponatremia, improving   -trend labs, cont ivf    #Elevated AST/ALT, improving   -due to ETOH abuse  -trend     #Facial / nose laceration and Partial hematoma         Guero Sepulveda DO    Supplementary Documentation:     Quality:  DVT Mechanical Prophylaxis:   SCDs,    DVT Pharmacologic Prophylaxis   Medication   None                Code Status: Full Code  Stanley: External urinary catheter in place  Stanley Duration (in days):   Central line:    JAZIEL:     Discharge is dependent on: clinical improvement  At this point Mr. Castellano is expected to be discharge to: tbd    The 21st Century Cures Act makes medical notes like these available to patients in the interest of transparency. Please be advised this is a medical document. Medical  documents are intended to carry relevant information, facts as evident, and the clinical opinion of the practitioner. The medical note is intended as peer to peer communication and may appear blunt or direct. It is written in medical language and may contain abbreviations or verbiage that are unfamiliar.

## 2024-03-16 NOTE — PLAN OF CARE
Assumed care of pt at change of shift.  Pt awake and alert to self and place, at times able to state month and year. Pt knows he fell in the tub and was brought by EMS.  Escorted pt to CT scan. Pt continued to have double vision. His blood pressure began rising with diastolic Bps >100, APN aware, MRI ordered.   Pt awake and cooperative, discussed with APRN, RN bedside swallow evaluation performed, pt did well with water and applesauce, no coughing.   Pt accidentally partially removed NG tube, discussed with APRN, instructed to remove NG tube.  Pt with multiple loose stools. [facility]

## 2024-03-16 NOTE — PROGRESS NOTES
ICU  Critical Care APRN Progress Note    NAME: Philip Castellano - ROOM: 459/459-A - MRN: [id number] - Age: 38 year old - :1985    Subjective:  No acute events overnight.  Patient with complaints of double vision.    OBJECTIVE  Vitals:  BP (!) 143/108   Pulse 115   Temp (!) 100.7 °F (38.2 °C) (Temporal)   Resp 21   Ht 185.4 cm (6' 1\")   Wt 145 lb 1 oz (65.8 kg)   SpO2 98%   BMI 19.14 kg/m²              O2: self extubated, NC    Physical Exam:    General Appearance:awake, calm, confused  HEENT: No JVD, tongue swollen and ecchymotic  Lungs: Clear to auscultation bilaterally, respirations unlabored  Heart: Regular rate and rhythm, S1 and S2 normal, no murmur, rub or gallop  Abdomen: Soft, non-tender, bowel sounds active all four quadrants, no masses, no organomegaly  Extremities: Extremities normal, atraumatic, no cyanosis or edema,capillary refill <3 sec.    Pulses: 2+ and symmetric all extremities  Skin: Skin color, texture, turgor normal for ethnicity, no rashes or lesions, warm and dry  Neurologic: awake, oriented to self only, cooperative after self extubating    Data this admission:  XR CHEST AP PORTABLE  (CPT=71045)    Result Date: 3/13/2024  CONCLUSION:  1. Placement of ET tube and NG tube when compared to 3/11/2024 examination. 2. Radiopaque body projects over the GE junction, correlate clinically. 3. Slight elevation of the right hemidiaphragm with development of right lower lobe retrocardiac opacity may represent atelectasis, infection or aspiration cannot be excluded, correlate clinically.  Clinical service notified of these findings with preliminary radiology report from [facility] services.    LOCATION:  [facility]      Dictated by (CST): Anya Marquez MD on 3/13/2024 at 7:38 AM     Finalized by (CST): Anya Marquez MD on 3/13/2024 at 7:41 AM       CT ANGIOGRAPHY, CHEST (CPT=71275)    Result Date: 3/13/2024  CONCLUSION:  1. No CT evidence for pulmonary embolism. 2. Right lower lobe partial atelectasis with  endobronchial fluid/debris, consider aspiration.  Recommend follow-up to ensure resolution and to exclude a perihilar mass.  Clinical service notified of these findings with preliminary radiology report from [facility] services.     LOCATION:  [facility]   Dictated by (CST): Anya Marquez MD on 3/13/2024 at 7:10 AM     Finalized by (CST): Anya Marquez MD on 3/13/2024 at 7:16 AM       CT BRAIN OR HEAD (54835)    Result Date: 3/13/2024  CONCLUSION:  No acute intracranial hemorrhage.  Clinical service notified of these findings with preliminary radiology report from [facility] services.   LOCATION:  [facility]   Dictated by (CST): Anya Marquez MD on 3/13/2024 at 6:24 AM     Finalized by (CST): Anya Marquez MD on 3/13/2024 at 6:25 AM       XR CHEST AP PORTABLE  (CPT=71045)    Result Date: 3/11/2024  PROCEDURE:  XR CHEST AP PORTABLE  (CPT=71045)  TECHNIQUE:  AP chest radiograph was obtained.  COMPARISON:  None.  INDICATIONS:  mom heard thud, heavy ETOH use. Left hematoma, neck lac. Found in tub 238 level 2 trauma called. was coming around for EMS, seizing on arrival.  PATIENT STATED HISTORY: (As transcribed by Technologist)  Pt.   mom heard thud, heavy ETOH use. Left hematoma, neck lac. Found in tub 238 level 2 trauma called. was coming around for EMS, seizing on arrival.   FINDINGS: Cardiac silhouette and pulmonary vasculature are unremarkable. No consolidation, pleural effusion or pneumothorax.  Prominence of the right hilum is noted. IMPRESSION: No consolidation.  Prominence of the right hilum is noted.  A CT chest examination with contrast evaluate this region to exclude a mass is recommended.   LOCATION:  [facility]      Dictated by (CST): Agustin Rapp MD on 3/11/2024 at 5:53 PM     Finalized by (CST): Agustin Rapp MD on 3/11/2024 at 5:54 PM       CT ABDOMEN+PELVIS(CONTRAST ONLY)(CPT=74177)    Result Date: 3/11/2024  CONCLUSION:  1. Nonspecific fat stranding surrounding the ascending colon and cecum.  Findings may be related  to nonspecific colitis. 2. Severe diffuse hepatic steatosis.   LOCATION:  [id number]   Dictated by (CST): Sadiq Gordon MD on 3/11/2024 at 5:08 PM     Finalized by (CST): Sadiq Gordon MD on 3/11/2024 at 5:11 PM       CT BRAIN OR HEAD ([id number])    Result Date: 3/11/2024  CONCLUSION:   1. Negative for acute intracranial process.  2. Right frontal parietal scalp laceration and hematoma without calvarial fracture.    LOCATION:  [id number]   Dictated by (CST): Deandre Villalta MD on 3/11/2024 at 3:32 PM     Finalized by (CST): Deandre Villalta MD on 3/11/2024 at 3:34 PM       CT SPINE CERVICAL (CPT=72125)    Result Date: 3/11/2024  CONCLUSION:   1. No evidence of acute traumatic injury of the cervical spine.  2. Disc and endplate degenerative changes at C5-6 resulting in mild central canal stenosis and moderate left-sided foraminal stenosis.     LOCATION:  [id number]   Dictated by (CST): Deandre Villalta MD on 3/11/2024 at 3:08 PM     Finalized by (CST): Deandre Villalta MD on 3/11/2024 at 3:12 PM         Labs:  Lab Results   Component Value Date    CREATSERUM 0.52 03/16/2024    BUN 6 03/16/2024     03/16/2024    K 3.6 03/16/2024     03/16/2024    CO2 22.0 03/16/2024     03/16/2024    CA 8.3 03/16/2024    ALB 3.1 03/16/2024    ALKPHO 68 03/16/2024    BILT 0.7 03/16/2024    TP 6.4 03/16/2024    AST 72 03/16/2024    ALT 41 03/16/2024       Assessment/Plan:      ETOH withdrawal/Delirium Tremens  - Ethyl alcohol 151 on admission 3/11  - CIWA protocol  - MVI/thiamine/folic acid  - PRN ativan  - PRN haldol  - Scheduled phenobarb-weaning protocol  - Restraints PRN  - Psych consulted    Acute resp failure- due to inability to protect airway when on heavy sedation as well as tongue laceration/trauma.  CTA chest also suggestive of RLL PNA c/w aspiration PNA  - Extubated 3/15, on nasal cannula  - Decadron for lingual swelling likely from trauma during seizure  - Continue Zosyn (3/13-)    Seizures - Likely 2/2 to etoh withdrawal  - Keppra x1  in ED  - EEG without evidence of seizures  - CT brain/spine negative for acute intracranial process  - Seizure precautions  - CK minimally elevated, not c/w rhabdo  - Neuro signed off- no further work up at this time   - MRI brain for double vision pending     Scalp laceration/hematoma  - CT brain/spine unremarkable  - Trauma surgery evaluated, now signed off  - Monitor     Cdiff  - Cdiff positive, EIA negative  - CT abd with possible colitis  - PO vanc  -Contact plus isolation     Thrombocytopenia: Likely 2/2 to etoh use  - Plts improving  - No s/s of bleeding  - Monitor daily CBC    Abnormal CT chest- due to asp PNA, mucous plugging atelectasis  - may benefit from repeat CT scan as OP to ensure resolution    F/E/N  - Replete electrolytes per protocol  - regular diet     Proph   -SCDs for DVT prophylaxis. Chemical proph relatively contraindicated bc of TCP and recent trauma to head.     Dispo - Full code  - may transfer to floor    Plan of care discussed with intensivist Dr. Connor Bhatti, [facility]  Critical Care NP  Phone [phone]      Intensivist addendum:   I agree with APN note above. I have independently seen and evaluated the patient. I agree with the management plan outlined above. Phenobarbital taper with Ativan as needed. Zosyn for aspiration pneumonia. Tongue swelling is improved- steroids discontinued. Transfer to the floor. Pulmonary service will follow on the floor.    Mumtaz Ryan MD  Pulmonary and Critical Care Medicine

## 2024-03-17 ENCOUNTER — APPOINTMENT (OUTPATIENT)
Dept: GENERAL RADIOLOGY | Facility: HOSPITAL | Age: 39
End: 2024-03-17
Attending: INTERNAL MEDICINE
Payer: MEDICAID

## 2024-03-17 ENCOUNTER — APPOINTMENT (OUTPATIENT)
Dept: GENERAL RADIOLOGY | Facility: HOSPITAL | Age: 39
End: 2024-03-17
Attending: NURSE PRACTITIONER
Payer: MEDICAID

## 2024-03-17 LAB
ALBUMIN SERPL-MCNC: 3.2 G/DL (ref 3.4–5)
ALBUMIN/GLOB SERPL: 1.1 {RATIO} (ref 1–2)
ALP LIVER SERPL-CCNC: 55 U/L
ALT SERPL-CCNC: 33 U/L
ANION GAP SERPL CALC-SCNC: 8 MMOL/L (ref 0–18)
AST SERPL-CCNC: 39 U/L (ref 15–37)
BILIRUB SERPL-MCNC: 0.8 MG/DL (ref 0.1–2)
BILIRUB UR QL STRIP.AUTO: NEGATIVE
BUN BLD-MCNC: 3 MG/DL (ref 9–23)
CALCIUM BLD-MCNC: 8.6 MG/DL (ref 8.5–10.1)
CHLORIDE SERPL-SCNC: 100 MMOL/L (ref 98–112)
CLARITY UR REFRACT.AUTO: CLEAR
CO2 SERPL-SCNC: 25 MMOL/L (ref 21–32)
COLOR UR AUTO: YELLOW
CREAT BLD-MCNC: 0.5 MG/DL
EGFRCR SERPLBLD CKD-EPI 2021: 132 ML/MIN/1.73M2 (ref 60–?)
ERYTHROCYTE [DISTWIDTH] IN BLOOD BY AUTOMATED COUNT: 13 %
GLOBULIN PLAS-MCNC: 3 G/DL (ref 2.8–4.4)
GLUCOSE BLD-MCNC: 195 MG/DL (ref 70–99)
GLUCOSE BLD-MCNC: 94 MG/DL (ref 70–99)
GLUCOSE BLD-MCNC: 98 MG/DL (ref 70–99)
GLUCOSE UR STRIP.AUTO-MCNC: NORMAL MG/DL
HCT VFR BLD AUTO: 29.8 %
HGB BLD-MCNC: 10.6 G/DL
LEUKOCYTE ESTERASE UR QL STRIP.AUTO: NEGATIVE
LIPASE SERPL-CCNC: 108 U/L (ref ?–300)
MAGNESIUM SERPL-MCNC: 1.6 MG/DL (ref 1.6–2.6)
MCH RBC QN AUTO: 33.8 PG (ref 26–34)
MCHC RBC AUTO-ENTMCNC: 35.6 G/DL (ref 31–37)
MCV RBC AUTO: 94.9 FL
NITRITE UR QL STRIP.AUTO: NEGATIVE
OSMOLALITY SERPL CALC.SUM OF ELEC: 272 MOSM/KG (ref 275–295)
PH UR STRIP.AUTO: 6.5 [PH] (ref 5–8)
PHOSPHATE SERPL-MCNC: 2.8 MG/DL (ref 2.5–4.9)
PLATELET # BLD AUTO: 195 10(3)UL (ref 150–450)
POTASSIUM SERPL-SCNC: 2.8 MMOL/L (ref 3.5–5.1)
POTASSIUM SERPL-SCNC: 3.7 MMOL/L (ref 3.5–5.1)
PROT SERPL-MCNC: 6.2 G/DL (ref 6.4–8.2)
PROT UR STRIP.AUTO-MCNC: 30 MG/DL
RBC # BLD AUTO: 3.14 X10(6)UL
RBC UR QL AUTO: NEGATIVE
SODIUM SERPL-SCNC: 133 MMOL/L (ref 136–145)
SP GR UR STRIP.AUTO: 1.02 (ref 1–1.03)
UROBILINOGEN UR STRIP.AUTO-MCNC: NORMAL MG/DL
WBC # BLD AUTO: 6.6 X10(3) UL (ref 4–11)

## 2024-03-17 PROCEDURE — 99233 SBSQ HOSP IP/OBS HIGH 50: CPT | Performed by: HOSPITALIST

## 2024-03-17 PROCEDURE — 74018 RADEX ABDOMEN 1 VIEW: CPT | Performed by: NURSE PRACTITIONER

## 2024-03-17 PROCEDURE — 99233 SBSQ HOSP IP/OBS HIGH 50: CPT | Performed by: INTERNAL MEDICINE

## 2024-03-17 RX ORDER — POTASSIUM CHLORIDE 1.5 G/1.58G
40 POWDER, FOR SOLUTION ORAL ONCE
Status: COMPLETED | OUTPATIENT
Start: 2024-03-17 | End: 2024-03-17

## 2024-03-17 RX ORDER — POTASSIUM CHLORIDE 14.9 MG/ML
20 INJECTION INTRAVENOUS ONCE
Status: COMPLETED | OUTPATIENT
Start: 2024-03-17 | End: 2024-03-17

## 2024-03-17 RX ORDER — ENOXAPARIN SODIUM 100 MG/ML
40 INJECTION SUBCUTANEOUS DAILY
Status: DISCONTINUED | OUTPATIENT
Start: 2024-03-17 | End: 2024-03-19

## 2024-03-17 RX ORDER — SIMETHICONE 80 MG
160 TABLET,CHEWABLE ORAL 4 TIMES DAILY PRN
Status: DISCONTINUED | OUTPATIENT
Start: 2024-03-17 | End: 2024-03-18

## 2024-03-17 RX ORDER — MAGNESIUM OXIDE 400 MG/1
800 TABLET ORAL ONCE
Status: COMPLETED | OUTPATIENT
Start: 2024-03-17 | End: 2024-03-17

## 2024-03-17 NOTE — PROGRESS NOTES
ICU  Critical Care APRN Progress Note    NAME: Philip Castellano - ROOM: 459/459-A - MRN: [id number] - Age: 38 year old - :1985    Subjective:  No acute events overnight.  Max CIWA overnight 5.  No double vision this am.    OBJECTIVE  Vitals:  BP (!) 157/108 (BP Location: Right arm)   Pulse 112   Temp 99.9 °F (37.7 °C) (Temporal)   Resp 23   Ht 185.4 cm (6' 1\")   Wt 135 lb 5.8 oz (61.4 kg)   SpO2 99%   BMI 17.86 kg/m²              O2: self extubated, NC    Physical Exam:    General Appearance:awake, calm, confused  HEENT: No JVD, tongue swollen and ecchymotic  Lungs: Clear to auscultation bilaterally, respirations unlabored  Heart: Regular rate and rhythm, S1 and S2 normal, no murmur, rub or gallop  Abdomen: Soft, non-tender, bowel sounds active all four quadrants, no masses, no organomegaly  Extremities: Extremities normal, atraumatic, no cyanosis or edema,capillary refill <3 sec.    Pulses: 2+ and symmetric all extremities  Skin: Skin color, texture, turgor normal for ethnicity, no rashes or lesions, warm and dry  Neurologic: awake, oriented to self only, cooperative after self extubating    Data this admission:  XR CHEST AP PORTABLE  (CPT=71045)    Result Date: 3/13/2024  CONCLUSION:  1. Placement of ET tube and NG tube when compared to 3/11/2024 examination. 2. Radiopaque body projects over the GE junction, correlate clinically. 3. Slight elevation of the right hemidiaphragm with development of right lower lobe retrocardiac opacity may represent atelectasis, infection or aspiration cannot be excluded, correlate clinically.  Clinical service notified of these findings with preliminary radiology report from [facility] services.    LOCATION:  [facility]      Dictated by (CST): Anya Marquez MD on 3/13/2024 at 7:38 AM     Finalized by (CST): Anya Marquez MD on 3/13/2024 at 7:41 AM       CT ANGIOGRAPHY, CHEST (CPT=71275)    Result Date: 3/13/2024  CONCLUSION:  1. No CT evidence for pulmonary embolism. 2. Right lower  lobe partial atelectasis with endobronchial fluid/debris, consider aspiration.  Recommend follow-up to ensure resolution and to exclude a perihilar mass.  Clinical service notified of these findings [facility]     LOCATION:  [facility]   Dictated by (CST): Anya Marquez MD on 3/13/2024 at 7:10 AM     Finalized by (CST): Anya Marquez MD on 3/13/2024 at 7:16 AM       CT BRAIN OR HEAD (81014)    Result Date: 3/13/2024  CONCLUSION:  No acute intracranial hemorrhage.  Clinical service [facility] of these findings [facility]   [facility]ATION:  [facility]   Dictated by (CST): Anya Marquez MD on 3/13/2024 at 6:24 AM     Finalized by (CST): Anya Marquez MD on 3/13/2024 at 6:25 AM       XR CHEST AP PORTABLE  (CPT=71045)    Result Date: 3/11/2024  PROCEDURE:  XR CHEST AP PORTABLE  (CPT=71045)  TECHNIQUE:  AP chest radiograph was obtained.  COMPARISON:  None.  INDICATIONS:  mom heard thud, heavy ETOH use. Left hematoma, neck lac. Found in tub 238 level 2 trauma called. was coming around for EMS, seizing on arrival.  PATIENT STATED HISTORY: (As transcribed by Technologist)  Pt.   mom heard thud, heavy ETOH use. Left hematoma, neck lac. Found in tub 238 level 2 trauma called. was coming around for EMS, seizing on arrival.   FINDINGS: Cardiac silhouette and pulmonary vasculature are unremarkable. No consolidation, pleural effusion or pneumothorax.  Prominence of the right hilum is noted. IMPRESSION: No consolidation.  Prominence of the right hilum is noted.  A CT chest examination with contrast evaluate this region to exclude a mass is recommended.   LOCATION:  [facility]      Dictated by (CST): Agustin Rapp MD on 3/11/2024 at 5:53 PM     Finalized by (CST): Agustin Rapp MD on 3/11/2024 at 5:54 PM       CT ABDOMEN+PELVIS(CONTRAST ONLY)(CPT=74177)    Result Date: 3/11/2024  CONCLUSION:  1. Nonspecific fat stranding surrounding the ascending colon and  cecum.  Findings may be related to nonspecific colitis. 2. Severe diffuse hepatic steatosis.   LOCATION:  [id number]   Dictated by (CST): Sadiq Gordon MD on 3/11/2024 at 5:08 PM     Finalized by (CST): Sadiq Gordon MD on 3/11/2024 at 5:11 PM       CT BRAIN OR HEAD (78000)    Result Date: 3/11/2024  CONCLUSION:   1. Negative for acute intracranial process.  2. Right frontal parietal scalp laceration and hematoma without calvarial fracture.    LOCATION:  [id number]   Dictated by (CST): Deandre Villalta MD on 3/11/2024 at 3:32 PM     Finalized by (CST): Deandre Villalta MD on 3/11/2024 at 3:34 PM       CT SPINE CERVICAL (CPT=72125)    Result Date: 3/11/2024  CONCLUSION:   1. No evidence of acute traumatic injury of the cervical spine.  2. Disc and endplate degenerative changes at C5-6 resulting in mild central canal stenosis and moderate left-sided foraminal stenosis.     LOCATION:  [id number]   Dictated by (CST): Deandre Villalta MD on 3/11/2024 at 3:08 PM     Finalized by (CST): Deandre Villalta MD on 3/11/2024 at 3:12 PM         Labs:  Lab Results   Component Value Date    WBC 6.6 03/17/2024    HGB 10.6 03/17/2024    HCT 29.8 03/17/2024    .0 03/17/2024    CREATSERUM 0.50 03/17/2024    BUN 3 03/17/2024     03/17/2024    K 2.8 03/17/2024     03/17/2024    CO2 25.0 03/17/2024    GLU 94 03/17/2024    CA 8.6 03/17/2024    ALB 3.2 03/17/2024    ALKPHO 55 03/17/2024    BILT 0.8 03/17/2024    TP 6.2 03/17/2024    AST 39 03/17/2024    ALT 33 03/17/2024       Assessment/Plan:      ETOH withdrawal/Delirium Tremens  - Ethyl alcohol 151 on admission 3/11  - [facility] protocol  - MVI/thiamine/folic acid  - PRN ativan  - PRN haldol  - Scheduled phenobarb-weaning protocol  - Restraints PRN  - Psych consulted    Acute resp failure- due to inability to protect airway when on heavy sedation as well as tongue laceration/trauma.  CTA chest also suggestive of RLL PNA c/w aspiration PNA  - Extubated 3/15, on nasal cannula  - Decadron for lingual  swelling complete  - Continue Zosyn (3/13-)    Seizures - Likely 2/2 to etoh withdrawal  - Keppra x1 in ED  - EEG without evidence of seizures  - CT brain/spine negative for acute intracranial process  - Seizure precautions  - CK minimally elevated, not c/w rhabdo  - Neuro signed off- no further work up at this time      Scalp laceration/hematoma  - CT brain/spine unremarkable  - Trauma surgery evaluated, now signed off  - Monitor     Cdiff  - Cdiff positive, EIA negative  - CT abd with possible colitis  - PO vanc  -Contact plus isolation     Thrombocytopenia: Likely 2/2 to etoh use  - Plts improving  - No s/s of bleeding  - Monitor daily CBC    Abnormal CT chest- due to asp PNA, mucous plugging atelectasis  - may benefit from repeat CT scan as OP to ensure resolution    F/E/N  - Replete electrolytes per protocol  - regular diet     Proph   -SCDs for DVT prophylaxis.  - Lovenox     Dispo - Full code  - may transfer to floor    Plan of care discussed with intensivist Dr. Connor Bhat[facility]  Critical Care NP  Phone [phone]      Intensivist addendum:   I agree with APN note above. I have independently seen and evaluated the patient. I agree with the management plan outlined above. Doing well. Vision issues have resolved. Phenobarb taper. Ongoing fever. Empiric Zosyn for aspiration. PO vancomycin for C. diff. Repeat blood cultures. Check lipase given abdominal discomfort. Check AXR. Transfer to the floor. Will follow while ICU status.    Mumtaz Ryan MD  Pulmonary and Critical Care Medicine

## 2024-03-17 NOTE — PLAN OF CARE
Received pt A&Ox3 on RA. Disoriented to time, reorientation provided. CIWAs per protocol. C/o abdominal pain; CCAPN, intensivist and hospitalist all aware. Blood cultures obtained per order. Urine sent per order. Abdominal xray obtained per order; CCAPN aware of results. Voids via urinal. Up to bathroom with standby assist. Up in chair. Ambulated in olivas with standby assist. Tmax: 100.5F. VSS. NSR/ST on monitor. Family at bedside and updated on plan of care. See flowsheets. See MAR. Has transfer orders.

## 2024-03-17 NOTE — PLAN OF CARE
Assumed care of pt at change of shift.  Pt mental status labile, more alert later in the shift. Pt very impulsive, but redirectable. Pt very pleasant and cooperative.   Pt continued to be tachycardic with HR in low 100s, up to 140s with activity, APN aware.  Pt febrile overnight, improved after tylenol.

## 2024-03-17 NOTE — PROGRESS NOTES
[facility]   [facility]     Philip Castellano Patient Status:  Inpatient    1985 MRN [id number]   Location [facility] 3NE-A Attending Guero Sepulveda,    Hosp Day # 6 PCP Tish Guzman     Chief Complaint: seizure activity    Subjective:     Double vision resolved. Pt had Tmax 100.8. has abd pain. No n/v.     Objective:    Review of Systems:   A comprehensive review of systems was completed; pertinent positive and negatives stated in subjective.    Vital signs:  Temp:  [99.1 °F (37.3 °C)-100.8 °F (38.2 °C)] 100.5 °F (38.1 °C)  Pulse:  [] 120  Resp:  [16-23] 18  BP: (137-162)/() 137/95  SpO2:  [97 %-100 %] 98 %    Physical Exam:    General: No acute distress  Respiratory: Clear to auscultation no wheezes or rales  Cardiovascular: S1, S2, regular rate and rhythm  Abdomen: Soft, Non-tender, non-distended, positive bowel sounds  Neuro: No new focal deficits.   Extremities: No edema      Diagnostic Data:    Labs:  Recent Labs   Lab 24  0429 24  0430 03/15/24  0405 24  0924 24  0526   WBC 5.0 4.4 7.4 8.7 6.6   HGB 10.8* 10.7* 11.2* 11.6* 10.6*   MCV 97.2 96.5 97.0 96.7 94.9   PLT 63.0* 71.0* 123.0* 208.0 195.0       Recent Labs   Lab 03/15/24  0405 24  0534 24  0526   * 100* 94   BUN 3* 6* 3*   CREATSERUM 0.37* 0.52* 0.50*   CA 7.5* 8.3* 8.6   ALB 2.7* 3.1* 3.2*    133* 133*   K 3.9 3.6 2.8*    106 100   CO2 24.0 22.0 25.0   ALKPHO 51 68 55   AST 31 72* 39*   ALT 25 41 33   BILT 0.6 0.7 0.8   TP 5.4* 6.4 6.2*       Estimated Creatinine Clearance: 170.6 mL/min (A) (based on SCr of 0.5 mg/dL (L)).    Recent Labs   Lab 24  0033   *       No results for input(s): \"PTP\", \"INR\" in the last 168 hours.               Microbiology    Hospital Encounter on 24   1. Blood Culture     Status: None (Preliminary result)    Collection Time: 24  4:29 AM    Specimen: Blood,peripheral   Result  Value Ref Range    Blood Culture Result No Growth 4 Days N/A         Imaging: Reviewed in Epic.    Medications:    potassium chloride  20 mEq Intravenous Once    enoxaparin  40 mg Subcutaneous Daily    vancomycin  125 mg Oral QID    potassium phosphate monobasic  500 mg Oral TID CC and HS    multivitamin  1 tablet Oral Daily    insulin aspart  1-5 Units Subcutaneous 4 times per day    piperacillin-tazobactam  3.375 g Intravenous Q8H    thiamine  100 mg Oral Daily    folic acid  1 mg Oral Daily    PHENObarbital  32.5 mg Intravenous Q8H       Assessment & Plan:      #Febrile illness  -godwin underway  -abd pain, lipase wnl; abd XR pending  -remains on broad spectrum abx and po vanco    #Alcohol withdrawals  #Delirium tremons  -CIWA protocol w/ scheduled phenobarbital, taper  -cont MVI, thiamine, folic acid    #Acute hypoxemic resp failure  #Aspiration pneumonia  -intubated 3/12 for airway protection  s/p extubation 3/15  -S/p sedation w/ precedex + fentanyl + propofol gtt   -iv zosyn (3/13-)    #Seizures  -suspect ETOH withdrawal seizures  -given keppra in ER; since dc'ed by neuro  -EEG reviewed  -neuro eval noted and signed off  -CT brain negative     #Cdiff  -po vanco (3/13-)    #Tongue laceration   #Inflammation in oral cavity  -S/p decadron     #Double vision,resolved  -MRI brain cancelled      #Hyponatremia, improving   -trend labs, cont ivf    #Elevated AST/ALT, improving   -due to ETOH abuse  -trend     #Facial / nose laceration and Partial hematoma         Guero Sepulveda, DO    Supplementary Documentation:     Quality:  DVT Mechanical Prophylaxis:   SCDs,    DVT Pharmacologic Prophylaxis   Medication    enoxaparin (Lovenox) 40 MG/0.4ML SUBQ injection 40 mg                Code Status: Full Code  Stanley: No urinary catheter in place  Stanley Duration (in days):   Central line:    JAZIEL:     Discharge is dependent on: clinical improvement  At this point Mr. Catsellano is expected to be discharge to: tbd    The 21st Century Cures  Act makes medical notes like these available to patients in the interest of transparency. Please be advised this is a medical document. Medical documents are intended to carry relevant information, facts as evident, and the clinical opinion of the practitioner. The medical note is intended as peer to peer communication and may appear blunt or direct. It is written in medical language and may contain abbreviations or verbiage that are unfamiliar.

## 2024-03-18 LAB
ALBUMIN SERPL-MCNC: 3.3 G/DL (ref 3.4–5)
ALBUMIN/GLOB SERPL: 0.9 {RATIO} (ref 1–2)
ALP LIVER SERPL-CCNC: 58 U/L
ALT SERPL-CCNC: 41 U/L
ANION GAP SERPL CALC-SCNC: 6 MMOL/L (ref 0–18)
AST SERPL-CCNC: 50 U/L (ref 15–37)
BILIRUB SERPL-MCNC: 0.6 MG/DL (ref 0.1–2)
BUN BLD-MCNC: 3 MG/DL (ref 9–23)
CALCIUM BLD-MCNC: 9.2 MG/DL (ref 8.5–10.1)
CHLORIDE SERPL-SCNC: 104 MMOL/L (ref 98–112)
CO2 SERPL-SCNC: 19 MMOL/L (ref 21–32)
CREAT BLD-MCNC: 0.47 MG/DL
EGFRCR SERPLBLD CKD-EPI 2021: 135 ML/MIN/1.73M2 (ref 60–?)
ERYTHROCYTE [DISTWIDTH] IN BLOOD BY AUTOMATED COUNT: 12.8 %
GLOBULIN PLAS-MCNC: 3.6 G/DL (ref 2.8–4.4)
GLUCOSE BLD-MCNC: 92 MG/DL (ref 70–99)
HCT VFR BLD AUTO: 43.8 %
HGB BLD-MCNC: 14.9 G/DL
MAGNESIUM SERPL-MCNC: 1.8 MG/DL (ref 1.6–2.6)
MCH RBC QN AUTO: 33.6 PG (ref 26–34)
MCHC RBC AUTO-ENTMCNC: 34 G/DL (ref 31–37)
MCV RBC AUTO: 98.6 FL
OSMOLALITY SERPL CALC.SUM OF ELEC: 264 MOSM/KG (ref 275–295)
PHOSPHATE SERPL-MCNC: 3.8 MG/DL (ref 2.5–4.9)
PLATELET # BLD AUTO: 171 10(3)UL (ref 150–450)
POTASSIUM SERPL-SCNC: 3.6 MMOL/L (ref 3.5–5.1)
PROT SERPL-MCNC: 6.9 G/DL (ref 6.4–8.2)
RBC # BLD AUTO: 4.44 X10(6)UL
SODIUM SERPL-SCNC: 129 MMOL/L (ref 136–145)
WBC # BLD AUTO: 4.3 X10(3) UL (ref 4–11)

## 2024-03-18 PROCEDURE — 99233 SBSQ HOSP IP/OBS HIGH 50: CPT | Performed by: STUDENT IN AN ORGANIZED HEALTH CARE EDUCATION/TRAINING PROGRAM

## 2024-03-18 PROCEDURE — 99233 SBSQ HOSP IP/OBS HIGH 50: CPT | Performed by: HOSPITALIST

## 2024-03-18 RX ORDER — MAGNESIUM OXIDE 400 MG/1
400 TABLET ORAL ONCE
Status: COMPLETED | OUTPATIENT
Start: 2024-03-18 | End: 2024-03-18

## 2024-03-18 RX ORDER — POTASSIUM CHLORIDE 20 MEQ/1
20 TABLET, EXTENDED RELEASE ORAL DAILY
Status: DISCONTINUED | OUTPATIENT
Start: 2024-03-18 | End: 2024-03-19

## 2024-03-18 RX ORDER — SODIUM CHLORIDE 9 MG/ML
INJECTION, SOLUTION INTRAVENOUS CONTINUOUS
Status: DISCONTINUED | OUTPATIENT
Start: 2024-03-18 | End: 2024-03-19

## 2024-03-18 RX ORDER — SIMETHICONE 80 MG
160 TABLET,CHEWABLE ORAL 4 TIMES DAILY
Status: DISCONTINUED | OUTPATIENT
Start: 2024-03-18 | End: 2024-03-19

## 2024-03-18 NOTE — PAYOR COMM NOTE
--------------  CONTINUED STAY REVIEW    Payor: ROLAND  Subscriber #:  [id number]  Authorization Number: [id number]    Admit date: 3/11/24  Admit time:  6:21 PM    Admitting Physician: Sera Harris MD  Attending Physician:  Guero Sepulveda DO  Primary Care Physician: Tish Guzman    REVIEW DOCUMENTATION:   3-18-24      O2: RA     Physical Exam:    General Appearance:awake, calm,   HEENT: No JVD, tongue swollen and ecchymotic  Lungs: Clear to auscultation bilaterally, respirations unlabored  Heart: Regular rate and rhythm, S1 and S2 normal, no murmur, rub or gallop  Abdomen: Soft, non-tender, bowel sounds active all four quadrants, no masses, no organomegaly  Extremities: Extremities normal, atraumatic, no cyanosis or edema,capillary refill <3 sec.    Pulses: 2+ and symmetric all extremities  Skin: Skin color, texture, turgor normal for ethnicity, no rashes or lesions, warm and dry  Neurologic: awake, alert and oriented    ab Results   Component Value Date     WBC 4.3 03/18/2024     HGB 14.9 03/18/2024     HCT 43.8 03/18/2024     .0 03/18/2024     CREATSERUM 0.47 03/18/2024     BUN 3 03/18/2024      03/18/2024     K 3.6 03/18/2024      03/18/2024     CO2 19.0 03/18/2024     GLU 92 03/18/2024     CA 9.2 03/18/2024     ALB 3.3 03/18/2024     ALKPHO 58 03/18/2024     BILT 0.6 03/18/2024     TP 6.9 03/18/2024     AST 50 03/18/2024     ALT 41 03/18/2024     MG 1.8 03/18/2024     PHOS 3.8 03/17/2024         Assessment/Plan:        ETOH withdrawal/Delirium Tremens--> resolving  - Ethyl alcohol 151 on admission 3/11  - CIWA protocol discontinued   - MVI/thiamine/folic acid  - Psych following     Acute resp failure- due to inability to protect airway when on heavy sedation as well as tongue laceration/trauma.  CTA chest also suggestive of RLL PNA c/w aspiration PNA  - Extubated 3/15, now on RA  - Decadron for lingual swelling complete  - Continue Zosyn (3/13-)     Seizures - Likely 2/2 to etoh  withdrawal  - Keppra x1 in ED  - EEG without evidence of seizures  - CT brain/spine negative for acute intracranial process  - Seizure precautions  - CK minimally elevated, not c/w rhabdo  - Neuro signed off- no further work up at this time      Scalp laceration/hematoma  - CT brain/spine unremarkable  - Trauma surgery evaluated, now signed off  - Monitor     NAGMA: Likely 2/2 diarrhea  -monitor  -bicarb improving     Hyponatremia  -Monitor Na daily  -encourage PO     Cdiff  - Cdiff positive, EIA negative  - CT abd with possible colitis  - PO vanc  -Contact plus isolation     Thrombocytopenia: Likely 2/2 to etoh use  - Plts improving  - No s/s of bleeding  - Monitor daily CBC     Abnormal CT chest- due to asp PNA, mucous plugging atelectasis  - may benefit from repeat CT scan as OP to ensure resolution     F/E/N  - Replete electrolytes per protocol  - regular diet     Proph   -SCDs  - Lovenox     Dispo - Full code  - may transfer to floor             MEDICATIONS ADMINISTERED IN LAST 1 DAY:  enoxaparin (Lovenox) 40 MG/0.4ML SUBQ injection 40 mg       Date Action Dose Route User    3/18/2024 0829 Given 40 mg Subcutaneous (Left Lower Abdomen) Hollis Moody RN          folic acid (Folvite) tab 1 mg       Date Action Dose Route User    3/18/2024 0828 Given 1 mg Oral Hollis Moody RN          LORazepam (Ativan) tab 2 mg       Date Action Dose Route User    3/18/2024 0341 Given 2 mg Oral Celina Griffin RN          magnesium oxide (Mag-Ox) tab 400 mg       Date Action Dose Route User    3/18/2024 0828 Given 400 mg Oral Hollis Moody RN          PHENobarbital (Luminal) 65 mg/mL injection 32.5 mg       Date Action Dose Route User    3/18/2024 1302 Given 32.5 mg Intravenous Hollis Moody RN    3/18/2024 0446 Given 32.5 mg Intravenous Celina Griffin RN    3/17/2024 2054 Given 32.5 mg Intravenous Celina Griffin RN          piperacillin-tazobactam (Zosyn) 3.375 g in dextrose 5% 100 mL IVPB-ADDV       Date Action Dose Route User     3/18/2024 1304 New Bag 3.375 g Intravenous Hollis Moody RN    3/18/2024 0446 New Bag 3.375 g Intravenous Celina Griffin RN    3/17/2024 2054 New Bag 3.375 g Intravenous Celina Griffin RN          potassium chloride (K-Dur) tab 20 mEq       Date Action Dose Route User    3/18/2024 1302 Given 20 mEq Oral Hollis Moody RN          potassium phosphate monobasic (K-Phos) tab 500 mg       Date Action Dose Route User    3/18/2024 1313 Given 500 mg Oral Hollis Moody RN    3/18/2024 0845 Given 500 mg Oral Hollis Moody RN    3/17/2024 2318 Given 500 mg Oral Celina Griffin RN    3/17/2024 1647 Given 500 mg Oral Blessing Toussaint RN          simethicone (Mylicon) chewable tab 160 mg       Date Action Dose Route User    3/17/2024 1743 Given 160 mg Oral Blessing Toussaint RN          simethicone (Mylicon) chewable tab 160 mg       Date Action Dose Route User    3/18/2024 1302 Given 160 mg Oral Hollis Moody RN          sodium chloride 0.9% infusion   83 ML HR        Date Action Dose Route User    3/18/2024 0846 New Bag (none) Intravenous Hollis Moody RN          thiamine (Vitamin B1) tab 100 mg       Date Action Dose Route User    3/18/2024 0828 Given 100 mg Oral Hollis Moody RN          vancomycin (Vancocin) cap 125 mg       Date Action Dose Route User    3/18/2024 1302 Given 125 mg Oral Hollis Moody RN    3/18/2024 0828 Given 125 mg Oral Hollis Moody RN    3/17/2024 2053 Given 125 mg Oral Celina Griffin RN    3/17/2024 1647 Given 125 mg Oral Blessing Toussaint RN            Vitals (last day)       Date/Time Temp Pulse Resp BP SpO2 Weight O2 Device O2 Flow Rate (L/min) [facility]    03/18/24 1200 98.4 °F (36.9 °C) 105 19 134/92 100 % -- None (Room air) -- MS    03/18/24 0800 98.6 °F (37 °C) 102 21 134/99 95 % -- None (Room air) -- MS    03/18/24 0500 -- -- -- -- -- 135 lb 12.9 oz -- -- AA    03/18/24 0400 99.8 °F (37.7 °C) 109 21 148/99 100 % -- None (Room air) -- AA    03/18/24 0000 98.7 °F (37.1 °C) 99 21 151/106 98 % -- None  (Room air) -- AA    03/17/24 2000 99.2 °F (37.3 °C) 102 16 166/95 100 % -- None (Room air) -- AA    03/17/24 1649 99.4 °F (37.4 °C) -- -- -- -- -- -- -- LAYNE    03/17/24 1600 -- 94 25 133/99 99 % -- None (Room air) -- AL    03/17/24 1530 99.6 °F (37.6 °C) -- -- -- -- -- -- -- AL    03/17/24 1230 98.6 °F (37 °C) -- -- -- -- -- -- -- AL    03/17/24 1200 -- 108 13 133/93 97 % -- None (Room air) -- AL    03/17/24 1131 100.5 °F (38.1 °C) 120 18 -- 98 % -- -- -- AL    03/17/24 0900 99.1 °F (37.3 °C) -- -- -- -- -- -- -- AL          CIWA Scores (since admission)       Date/Time CIWA-Ar Total Who    03/18/24 0630 5 AA    03/18/24 0431 0 AA    03/18/24 0338 13 AA    03/18/24 0000 0 AA    03/17/24 2000 0 AA    03/17/24 1800 3 AL    03/17/24 1600 3 AL    03/17/24 1400 3 AL    03/17/24 1200 3 AL    03/17/24 1000 3 AL

## 2024-03-18 NOTE — PROGRESS NOTES
ICU  Critical Care APRN Progress Note    NAME: Philip Castellano - ROOM: 459/459-A - MRN: [id number] - Age: 38 year old - :1985    Subjective:  No acute events overnight.    OBJECTIVE  Vitals:  BP (!) 134/99 (BP Location: Right arm)   Pulse 102   Temp 98.6 °F (37 °C) (Temporal)   Resp 21   Ht 185.4 cm (6' 1\")   Wt 135 lb 12.9 oz (61.6 kg)   SpO2 95%   BMI 17.92 kg/m²              O2: RA    Physical Exam:    General Appearance:awake, calm,   HEENT: No JVD, tongue swollen and ecchymotic  Lungs: Clear to auscultation bilaterally, respirations unlabored  Heart: Regular rate and rhythm, S1 and S2 normal, no murmur, rub or gallop  Abdomen: Soft, non-tender, bowel sounds active all four quadrants, no masses, no organomegaly  Extremities: Extremities normal, atraumatic, no cyanosis or edema,capillary refill <3 sec.    Pulses: 2+ and symmetric all extremities  Skin: Skin color, texture, turgor normal for ethnicity, no rashes or lesions, warm and dry  Neurologic: awake, alert and oriented      Data this admission:  XR CHEST AP PORTABLE  (CPT=71045)    Result Date: 3/13/2024  CONCLUSION:  1. Placement of ET tube and NG tube when compared to 3/11/2024 examination. 2. Radiopaque body projects over the GE junction, correlate clinically. 3. Slight elevation of the right hemidiaphragm with development of right lower lobe retrocardiac opacity may represent atelectasis, infection or aspiration cannot be excluded, correlate clinically.  Clinical service notified of these findings with preliminary radiology report from [facility] services.    LOCATION:  [facility]      Dictated by (CST): Anya Marquez MD on 3/13/2024 at 7:38 AM     Finalized by (CST): Anya Marquez MD on 3/13/2024 at 7:41 AM       CT ANGIOGRAPHY, CHEST (CPT=71275)    Result Date: 3/13/2024  CONCLUSION:  1. No CT evidence for pulmonary embolism. 2. Right lower lobe partial atelectasis with endobronchial fluid/debris, consider aspiration.  Recommend follow-up to ensure  resolution and to exclude a perihilar mass.  Clinical service notified of these findings with preliminary radiology report from [facility] services.     LOCATION:  [facility]   Dictated by (CST): Anya Marquez MD on 3/13/2024 at 7:10 AM     Finalized by (CST): Anya Marquez MD on 3/13/2024 at 7:16 AM       CT BRAIN OR HEAD (76189)    Result Date: 3/13/2024  CONCLUSION:  No acute intracranial hemorrhage.  Clinical service notified of these findings with preliminary radiology report from [facility].   LOCATION:  [facility]   Dictated by (CST): Anya Marquez MD on 3/13/2024 at 6:24 AM     Finalized by (CST): Anya Marquez MD on 3/13/2024 at 6:25 AM       XR CHEST AP PORTABLE  (CPT=71045)    Result Date: 3/11/2024  PROCEDURE:  XR CHEST AP PORTABLE  (CPT=71045)  TECHNIQUE:  AP chest radiograph was obtained.  COMPARISON:  None.  INDICATIONS:  mom heard thud, heavy ETOH use. Left hematoma, neck lac. Found in tub 238 level 2 trauma called. was coming around for EMS, seizing on arrival.  PATIENT STATED HISTORY: (As transcribed by Technologist)  Pt.   mom heard thud, heavy ETOH use. Left hematoma, neck lac. Found in tub 238 level 2 trauma called. was coming around for EMS, seizing on arrival.   FINDINGS: Cardiac silhouette and pulmonary vasculature are unremarkable. No consolidation, pleural effusion or pneumothorax.  Prominence of the right hilum is noted. IMPRESSION: No consolidation.  Prominence of the right hilum is noted.  A CT chest examination with contrast evaluate this region to exclude a mass is recommended.   LOCATION:  [facility]      Dictated by (CST): Agustin Rapp MD on 3/11/2024 at 5:53 PM     Finalized by (CST): Agustin Rapp MD on 3/11/2024 at 5:54 PM       CT ABDOMEN+PELVIS(CONTRAST ONLY)(CPT=74177)    Result Date: 3/11/2024  CONCLUSION:  1. Nonspecific fat stranding surrounding the ascending colon and cecum.  Findings may be related to nonspecific colitis. 2. Severe diffuse hepatic steatosis.   LOCATION:  [facility]    Dictated by (CST): Sadiq Gordon MD on 3/11/2024 at 5:08 PM     Finalized by (CST): Sadiq Gordon MD on 3/11/2024 at 5:11 PM       CT BRAIN OR HEAD ([id number])    Result Date: 3/11/2024  CONCLUSION:   1. Negative for acute intracranial process.  2. Right frontal parietal scalp laceration and hematoma without calvarial fracture.    LOCATION:  [id number]   Dictated by (CST): Deandre Villalta MD on 3/11/2024 at 3:32 PM     Finalized by (CST): Deandre Villalta MD on 3/11/2024 at 3:34 PM           Labs:  Lab Results   Component Value Date    WBC 4.3 03/18/2024    HGB 14.9 03/18/2024    HCT 43.8 03/18/2024    .0 03/18/2024    CREATSERUM 0.47 03/18/2024    BUN 3 03/18/2024     03/18/2024    K 3.6 03/18/2024     03/18/2024    CO2 19.0 03/18/2024    GLU 92 03/18/2024    CA 9.2 03/18/2024    ALB 3.3 03/18/2024    ALKPHO 58 03/18/2024    BILT 0.6 03/18/2024    TP 6.9 03/18/2024    AST 50 03/18/2024    ALT 41 03/18/2024    MG 1.8 03/18/2024    PHOS 3.8 03/17/2024       Assessment/Plan:      ETOH withdrawal/Delirium Tremens--> resolving  - Ethyl alcohol 151 on admission 3/11  - CIWA protocol discontinued   - MVI/thiamine/folic acid  - Psych following    Acute resp failure- due to inability to protect airway when on heavy sedation as well as tongue laceration/trauma.  CTA chest also suggestive of RLL PNA c/w aspiration PNA  - Extubated 3/15, now on RA  - Decadron for lingual swelling complete  - Continue Zosyn (3/13-)    Seizures - Likely 2/2 to etoh withdrawal  - Keppra x1 in ED  - EEG without evidence of seizures  - CT brain/spine negative for acute intracranial process  - Seizure precautions  - CK minimally elevated, not c/w rhabdo  - Neuro signed off- no further work up at this time      Scalp laceration/hematoma  - CT brain/spine unremarkable  - Trauma surgery evaluated, now signed off  - Monitor    NAGMA: Likely 2/2 diarrhea  -monitor  -bicarb improving    Hyponatremia  -Monitor Na daily  -encourage PO     Cdiff  -  Cdiff positive, EIA negative  - CT abd with possible colitis  - PO vanc  -Contact plus isolation     Thrombocytopenia: Likely 2/2 to etoh use  - Plts improving  - No s/s of bleeding  - Monitor daily CBC    Abnormal CT chest- due to asp PNA, mucous plugging atelectasis  - may benefit from repeat CT scan as OP to ensure resolution    F/E/N  - Replete electrolytes per protocol  - regular diet     Proph   -SCDs  - Lovenox     Dispo - Full code  - may transfer to floor    Plan of care discussed with intensivist Dr. Trever Cartwright, [facility]  ICU  Phone  [phone]   Pager [phone]    Intensivist Addendum:  I agree with APN note above. I have independently seen & evaluated the patient.  I agree with the management plan outlined above with the following changes/additions:    Awaiting transfer to the floor, has recovered from etoh withdrawal and seizures.  Continues on treatment for Cdiff infection. Overall has improved from critical care and pulmonary standpoint.  Will follow peripherally after leaves ICU    L3

## 2024-03-18 NOTE — PLAN OF CARE
Assumed care of patient following shift report. Patient is alert and oriented. Remains slightly impulsive, but overall doing better. Patient denies pain at this time. Bed alarm in place. Seizure precautions remain in place. See MAR & flowsheets for additional information.

## 2024-03-18 NOTE — PLAN OF CARE
Bedside report received around 0700. Pt was observed resting in bed. Pt did attempt to get out of bed x1 early in AM. It was noted by NOC shift that the pt had not slept over night. He slept for a total of 3 hours. Pt Remained alert and oriented. Minor tremors noted, he spoke about being ready to go to rehab and getting better.     Pt ambulated around the unit, it was noted that PT's HR increases as high as 130s w/ ambulation. Pt states that his primary doctor told him \"that I have tachycardia\". Pt educated on what that term meant. Pt is unaware of how high his HR usually is. Hospitalist MD/PA was made aware, no orders received.    Pt was in chair for meals during shift. Pt now resting in bed, alarm on, call light in place. Night Shift endorsed. Bedside report given.

## 2024-03-18 NOTE — PROGRESS NOTES
Patient is scheduled for admission into the [facility] 28 day residential program upon discharge from the hospital. Patient's  tentative date is 3/20/24. Patient will be transported to the program by [facility]. The program is located, [address] [place] [address], [facility]. Contact is Derek Steele, [phone].

## 2024-03-18 NOTE — PROGRESS NOTES
[facility]   part of [facility]     Hospitalist Progress Note     Philip Castellano Patient Status:  Inpatient    1985 MRN [id number]   Location [facility] 3NE-A Attending Guero Sepulveda,    Hosp Day # 7 PCP Tish Guzman     Chief Complaint: seizure activity    Subjective:     Tmax 100.5 in last 24h; last fever at 1131 AM on 3/17, afebrile since. Pt denies n/v; abd pain resolved. Pt cont to have loose stools.     Objective:    Review of Systems:   A comprehensive review of systems was completed; pertinent positive and negatives stated in subjective.    Vital signs:  Temp:  [98.6 °F (37 °C)-100.5 °F (38.1 °C)] 98.6 °F (37 °C)  Pulse:  [] 102  Resp:  [13-25] 21  BP: (133-166)/() 134/99  SpO2:  [95 %-100 %] 95 %    Physical Exam:    General: No acute distress  Respiratory: Clear to auscultation no wheezes or rales  Cardiovascular: S1, S2, regular rate and rhythm  Abdomen: Soft, Non-tender, non-distended, positive bowel sounds  Neuro: No new focal deficits.   Extremities: No edema      Diagnostic Data:    Labs:  Recent Labs   Lab 24  0430 03/15/24  0405 24  0924 24  0526 24  0500   WBC 4.4 7.4 8.7 6.6 4.3   HGB 10.7* 11.2* 11.6* 10.6* 14.9   MCV 96.5 97.0 96.7 94.9 98.6   PLT 71.0* 123.0* 208.0 195.0 171.0       Recent Labs   Lab 24  0534 24  0526 24  1548 24  0500   * 94  --  92   BUN 6* 3*  --  3*   CREATSERUM 0.52* 0.50*  --  0.47*   CA 8.3* 8.6  --  9.2   ALB 3.1* 3.2*  --  3.3*   * 133*  --  129*   K 3.6 2.8* 3.7 3.6    100  --  104   CO2 22.0 25.0  --  19.0*   ALKPHO 68 55  --  58   AST 72* 39*  --  50*   ALT 41 33  --  41   BILT 0.7 0.8  --  0.6   TP 6.4 6.2*  --  6.9       Estimated Creatinine Clearance: 182 mL/min (A) (based on SCr of 0.47 mg/dL (L)).    Recent Labs   Lab 24  0033   *       No results for input(s): \"PTP\", \"INR\" in the last 168 hours.               Microbiology    Hospital Encounter  on 03/11/24   1. Blood Culture     Status: None (Preliminary result)    Collection Time: 03/13/24  4:29 AM    Specimen: Blood,peripheral   Result Value Ref Range    Blood Culture Result No Growth 4 Days N/A         Imaging: Reviewed in Epic.    Medications:    magnesium oxide  400 mg Oral Once    enoxaparin  40 mg Subcutaneous Daily    piperacillin-tazobactam  3.375 g Intravenous Q8H    vancomycin  125 mg Oral QID    potassium phosphate monobasic  500 mg Oral TID CC and HS    thiamine  100 mg Oral Daily    folic acid  1 mg Oral Daily    PHENObarbital  32.5 mg Intravenous Q8H       Assessment & Plan:      #Febrile illness, improving   -godwin underway  -abd pain resolved, lipase wnl; abd XR reviewed   -remains on broad spectrum abx and po vanco    #Cdiff colitis  -po vanco (3/13-)  -KUB reviewed, will monitor, need to be aware of toxic megacolon, abd pain improved, if resume will get gen sx involved     #Alcohol withdrawals  #Delirium tremons  -CIWA protocol w/ scheduled phenobarbital, taper  -cont MVI, thiamine, folic acid    #NAGMA  #Hyponatremia  -encourage po  -initiated ivf  -monitor labs     #Acute hypoxemic resp failure  #Aspiration pneumonia  -intubated 3/12 for airway protection  s/p extubation 3/15  -S/p sedation w/ precedex + fentanyl + propofol gtt   -iv zosyn (3/13-)    #Seizures  -suspect ETOH withdrawal seizures  -given keppra in ER; since dc'ed by neuro  -EEG reviewed  -neuro eval noted and signed off  -CT brain negative     #Tongue laceration   #Inflammation in oral cavity  -S/p decadron     #Double vision,resolved  -MRI brain cancelled     #Elevated AST/ALT, improving   -due to ETOH abuse  -trend     #Facial / nose laceration and Partial hematoma         Guero Sepulveda, DO    Supplementary Documentation:     Quality:  DVT Mechanical Prophylaxis:   SCDs,    DVT Pharmacologic Prophylaxis   Medication    enoxaparin (Lovenox) 40 MG/0.4ML SUBQ injection 40 mg                Code Status: Full Code  Stanley: No  urinary catheter in place  Stanley Duration (in days):   Central line:    JAZIEL:     Discharge is dependent on: clinical improvement  At this point Mr. Castellano is expected to be discharge to: tbd    The 21st Century Cures Act makes medical notes like these available to patients in the interest of transparency. Please be advised this is a medical document. Medical documents are intended to carry relevant information, facts as evident, and the clinical opinion of the practitioner. The medical note is intended as peer to peer communication and may appear blunt or direct. It is written in medical language and may contain abbreviations or verbiage that are unfamiliar.

## 2024-03-19 VITALS
HEART RATE: 100 BPM | OXYGEN SATURATION: 99 % | WEIGHT: 135.81 LBS | HEIGHT: 73 IN | TEMPERATURE: 98 F | BODY MASS INDEX: 18 KG/M2 | RESPIRATION RATE: 12 BRPM | SYSTOLIC BLOOD PRESSURE: 136 MMHG | DIASTOLIC BLOOD PRESSURE: 79 MMHG

## 2024-03-19 LAB
ALBUMIN SERPL-MCNC: 3.3 G/DL (ref 3.4–5)
ALBUMIN/GLOB SERPL: 1.1 {RATIO} (ref 1–2)
ALP LIVER SERPL-CCNC: 48 U/L
ALT SERPL-CCNC: 34 U/L
ANION GAP SERPL CALC-SCNC: 5 MMOL/L (ref 0–18)
AST SERPL-CCNC: 32 U/L (ref 15–37)
BASOPHILS # BLD AUTO: 0.07 X10(3) UL (ref 0–0.2)
BASOPHILS NFR BLD AUTO: 1.6 %
BILIRUB SERPL-MCNC: 0.5 MG/DL (ref 0.1–2)
BUN BLD-MCNC: 2 MG/DL (ref 9–23)
CALCIUM BLD-MCNC: 8.8 MG/DL (ref 8.5–10.1)
CHLORIDE SERPL-SCNC: 106 MMOL/L (ref 98–112)
CO2 SERPL-SCNC: 24 MMOL/L (ref 21–32)
CREAT BLD-MCNC: 0.45 MG/DL
EGFRCR SERPLBLD CKD-EPI 2021: 137 ML/MIN/1.73M2 (ref 60–?)
EOSINOPHIL # BLD AUTO: 0.07 X10(3) UL (ref 0–0.7)
EOSINOPHIL NFR BLD AUTO: 1.6 %
ERYTHROCYTE [DISTWIDTH] IN BLOOD BY AUTOMATED COUNT: 12.7 %
GLOBULIN PLAS-MCNC: 3 G/DL (ref 2.8–4.4)
GLUCOSE BLD-MCNC: 93 MG/DL (ref 70–99)
HCT VFR BLD AUTO: 30.8 %
HGB BLD-MCNC: 10.5 G/DL
IMM GRANULOCYTES # BLD AUTO: 0.14 X10(3) UL (ref 0–1)
IMM GRANULOCYTES NFR BLD: 3.2 %
LYMPHOCYTES # BLD AUTO: 1.18 X10(3) UL (ref 1–4)
LYMPHOCYTES NFR BLD AUTO: 27.1 %
MAGNESIUM SERPL-MCNC: 1.8 MG/DL (ref 1.6–2.6)
MCH RBC QN AUTO: 33.1 PG (ref 26–34)
MCHC RBC AUTO-ENTMCNC: 34.1 G/DL (ref 31–37)
MCV RBC AUTO: 97.2 FL
MONOCYTES # BLD AUTO: 1.13 X10(3) UL (ref 0.1–1)
MONOCYTES NFR BLD AUTO: 26 %
NEUTROPHILS # BLD AUTO: 1.76 X10 (3) UL (ref 1.5–7.7)
NEUTROPHILS # BLD AUTO: 1.76 X10(3) UL (ref 1.5–7.7)
NEUTROPHILS NFR BLD AUTO: 40.5 %
OSMOLALITY SERPL CALC.SUM OF ELEC: 276 MOSM/KG (ref 275–295)
PLATELET # BLD AUTO: 327 10(3)UL (ref 150–450)
POTASSIUM SERPL-SCNC: 3.6 MMOL/L (ref 3.5–5.1)
PROT SERPL-MCNC: 6.3 G/DL (ref 6.4–8.2)
RBC # BLD AUTO: 3.17 X10(6)UL
SODIUM SERPL-SCNC: 135 MMOL/L (ref 136–145)
WBC # BLD AUTO: 4.4 X10(3) UL (ref 4–11)

## 2024-03-19 PROCEDURE — 99239 HOSP IP/OBS DSCHRG MGMT >30: CPT | Performed by: HOSPITALIST

## 2024-03-19 PROCEDURE — 99233 SBSQ HOSP IP/OBS HIGH 50: CPT | Performed by: STUDENT IN AN ORGANIZED HEALTH CARE EDUCATION/TRAINING PROGRAM

## 2024-03-19 RX ORDER — MELATONIN
100 DAILY
Qty: 30 TABLET | Refills: 0 | Status: SHIPPED | OUTPATIENT
Start: 2024-03-19

## 2024-03-19 RX ORDER — MAGNESIUM OXIDE 400 MG/1
400 TABLET ORAL ONCE
Status: COMPLETED | OUTPATIENT
Start: 2024-03-19 | End: 2024-03-19

## 2024-03-19 RX ORDER — FOLIC ACID 1 MG/1
1 TABLET ORAL DAILY
Qty: 30 TABLET | Refills: 0 | Status: SHIPPED | OUTPATIENT
Start: 2024-03-19

## 2024-03-19 RX ORDER — VANCOMYCIN HYDROCHLORIDE 125 MG/1
125 CAPSULE ORAL 4 TIMES DAILY
Qty: 32 CAPSULE | Refills: 0 | Status: SHIPPED | OUTPATIENT
Start: 2024-03-19 | End: 2024-03-27

## 2024-03-19 NOTE — PAYOR COMM NOTE
--------------  CONTINUED STAY REVIEW    Payor: [employer]OLAND  Subscriber #:  [id number]  Authorization Number: [id number]    Admit date: 3/11/24  Admit time:  6:21 PM    Admitting Physician: Sera Harris MD  Attending Physician:  Guero Sepulveda DO  Primary Care Physician: Tish Guzman    REVIEW DOCUMENTATION:   3-18-24     Tmax 100.5 in last 24h; last fever at 1131 AM on 3/17, afebrile since. Pt denies n/v; abd pain resolved. Pt cont to have loose stools.     Physical Exam:    General: No acute distress  Respiratory: Clear to auscultation no wheezes or rales  Cardiovascular: S1, S2, regular rate and rhythm  Abdomen: Soft, Non-tender, non-distended, positive bowel sounds  Neuro: No new focal deficits.   Extremities: No edema        Diagnostic Data:    Labs:      Recent Labs   Lab 03/18/24  0500   WBC 4.3   HGB 14.9   MCV 98.6   .0             Recent Labs   Lab 03/18/24  0500   GLU 92   BUN 3*   CREATSERUM 0.47*   CA 9.2   ALB 3.3*   *   K 3.6      CO2 19.0*   ALKPHO 58   AST 50*   ALT 41   BILT 0.6   TP 6.9      Assessment & Plan:   #Febrile illness, improving   -godwin underway  -abd pain resolved, lipase wnl; abd XR reviewed   -remains on broad spectrum abx and po vanco     #Cdiff colitis  -po vanco (3/13-)  -KUB reviewed, will monitor, need to be aware of toxic megacolon, abd pain improved, if resume will get gen sx involved      #Alcohol withdrawals  #Delirium tremons  -CIWA protocol w/ scheduled phenobarbital, taper  -cont MVI, thiamine, folic acid    #NAGMA  #Hyponatremia  -encourage po  -initiated ivf  -monitor labs      #Acute hypoxemic resp failure  #Aspiration pneumonia  -intubated 3/12 for airway protection  s/p extubation 3/15  -S/p sedation w/ precedex + fentanyl + propofol gtt   -iv zosyn (3/13-)     #Seizures  -suspect ETOH withdrawal seizures  -given keppra in ER; since dc'ed by neuro  -EEG reviewed  -neuro eval noted and signed off  -CT brain negative     #Tongue laceration    #Inflammation in oral cavity  -S/p decadron      #Double vision,resolved  -MRI brain cancelled      #Elevated AST/ALT, improving   -due to ETOH abuse  -trend     #Facial / nose laceration and Partial hematoma      MEDICATIONS ADMINISTERED IN LAST 1 DAY:  enoxaparin (Lovenox) 40 MG/0.4ML SUBQ injection 40 mg       Date Action Dose Route User    3/19/2024 0902 Given 40 mg Subcutaneous (Left Lower Abdomen) Hollis Moody RN          folic acid (Folvite) tab 1 mg       Date Action Dose Route User    3/19/2024 0903 Given 1 mg Oral Hollis Moody RN          magnesium oxide (Mag-Ox) tab 400 mg       Date Action Dose Route User    3/19/2024 0903 Given 400 mg Oral Hollis Moody RN          PHENobarbital (Luminal) 65 mg/mL injection 32.5 mg       Date Action Dose Route User    3/18/2024 1302 Given 32.5 mg Intravenous Hollis Moody RN          piperacillin-tazobactam (Zosyn) 3.375 g in dextrose 5% 100 mL IVPB-ADDV       Date Action Dose Route User    3/18/2024 1955 New Bag 3.375 g Intravenous Liz Floers RN    3/18/2024 1304 New Bag 3.375 g Intravenous Hollis Moody RN          potassium chloride (K-Dur) tab 20 mEq       Date Action Dose Route User    3/19/2024 0903 Given 20 mEq Oral Hollis Moody RN    3/18/2024 1302 Given 20 mEq Oral Hollis Moody RN          potassium phosphate monobasic (K-Phos) tab 500 mg       Date Action Dose Route User    3/19/2024 0905 Given 500 mg Oral Hollis Moody RN    3/18/2024 2157 Given 500 mg Oral Liz Flores RN    3/18/2024 1743 Given 500 mg Oral Hollis Moody RN    3/18/2024 1313 Given 500 mg Oral Hollis Moody RN          simethicone (Mylicon) chewable tab 160 mg       Date Action Dose Route User    3/19/2024 0902 Given 160 mg Oral Hollis Moody RN    3/18/2024 2157 Given 160 mg Oral Liz Flores RN    3/18/2024 1742 Given 160 mg Oral Hollis Moody RN    3/18/2024 1302 Given 160 mg Oral Hollis Moody RN          sodium chloride 0.9% infusion 83 ML HR       Date Action Dose  Route User    3/19/2024 0626 New Bag (none) Intravenous Liz Flores RN          thiamine (Vitamin B1) tab 100 mg       Date Action Dose Route User    3/19/2024 0903 Given 100 mg Oral Hollis Moody RN          vancomycin (Vancocin) cap 125 mg       Date Action Dose Route User    3/19/2024 0902 Given 125 mg Oral Hollis Moody RN    3/18/2024 2157 Given 125 mg Oral Liz Flores RN    3/18/2024 1742 Given 125 mg Oral Hollis Moody RN    3/18/2024 1302 Given 125 mg Oral Hollis Moody RN            Vitals (last day)       Date/Time Temp Pulse Resp BP SpO2 Weight O2 Device O2 Flow Rate (L/min) [facility]    03/19/24 0900 98.1 °F (36.7 °C) 128 19 -- 99 % -- -- -- MS    03/19/24 0800 99 °F (37.2 °C) 99 16 139/91 100 % -- None (Room air) -- MS    03/19/24 0400 99.5 °F (37.5 °C) 106 18 152/105 99 % -- None (Room air) 2 L/min LZ    03/19/24 0000 99.7 °F (37.6 °C) 114 26 150/88 96 % -- None (Room air) -- LZ    03/18/24 2000 99.9 °F (37.7 °C) 108 16 144/102 97 % -- None (Room air) -- LZ    03/18/24 1600 98.6 °F (37 °C) 112 22 134/103 94 % -- None (Room air) -- MS    03/18/24 1200 98.4 °F (36.9 °C) 105 19 134/92 100 % -- None (Room air) -- MS    03/18/24 0800 98.6 °F (37 °C) 102 21 134/99 95 % -- None (Room air) -- MS    03/18/24 0500 -- -- -- -- -- 135 lb 12.9 oz -- -- AA    03/18/24 0400 99.8 °F (37.7 °C) 109 21 148/99 100 % -- None (Room air) -- AA    03/18/24 0000 98.7 °F (37.1 °C) 99 21 151/106 98 % -- None (Room air) -- AA          CIWA Scores (since admission)       Date/Time CIWA-Ar Total Who    03/18/24 0630 5 AA    03/18/24 0431 0 AA    03/18/24 0338 13 AA    03/18/24 0000 0 AA    03/17/24 2000 0 AA    03/17/24 1800 3 AL    03/17/24 1600 3 AL

## 2024-03-19 NOTE — PLAN OF CARE
Pt ruled as medically cleared by [facility]. discharge order placed. [facility] contacted to update on discharge date. [facility] w/ [facility] was informed. Family updated, they are concerned for pt's plan to come home before going to rehab. Pt encouraged to f/u w/ [facility] as soon as he got home. Discharge instructions given to patient, pt educated.     Meds to beds was used to get pt's vancomycin. Pt encouraged to ask any questions and to reach out for any questions he might have later. Ivs were taken out. Belongings were returned to pt. Pt left via wheelchair at 1425.

## 2024-03-19 NOTE — DISCHARGE SUMMARY
[facility]     Philip Castellano Patient Status:  Inpatient    1985 MRN [id number]   Location [facility] 4SW-A Attending Guero Sepulveda, DO   Hosp Day # 8 PCP Tish Guzman     Date of Admission: 3/11/2024  Date of Discharge:   3/19/2024     Discharge Disposition: Home     Discharge Diagnosis:  #Acute hypoxemic resp failure, extubated 3/15  #Aspiration pneumonia, resolved  #C. difficile colitis, improved  #Alcohol withdrawals, Delirium tremens, resolved  #NAGMA, resolved  #Hyponatremia, resolved  #Seizures suspect ETOH withdrawal seizures  #Tongue laceration, inflammation in oral cavity, resolved   #Double vision, resolved  #Elevated AST/ALT due to ETOH abuse, resolved  #Facial/scalp/nose laceration and Partial hematoma    History of Present Illness: Philip Castellnao is a 38 year old male with medical history of alcohol abuse who comes to the ER for evaluation after a fall at home.  His mother called paramedics as she heard 2 loud falls.  He was found in the tub by EMS with a large hematoma on the right side of his head and a laceration to his nose.  He was brought to the ER and on arrival to the ER he started having a seizure which lasted approximately 15 seconds.  He states that his last drink was 2 nights ago.  He does report that he does have a drinking problem and binge drinks frequently.  Today he reports he did have some marijuana.  Currently he is awake alert.  He reports he bit his tongue and is noted to have trauma to his nose and blood around his lips.     Brief Synopsis: Patient is a 38-year-old male who presented after fall at home.  He was brought into the ER for further workup and was noted to have seizure.  He has a history of EtOH abuse.   Patient had a scalp laceration.  CT of the brain no bleeding.  No fracture seen on CT C-spine.  CT abdomen pelvis showing nonspecific colitis.  He received Keppra.  He was admitted for further workup and [facility] protocol was  initiated.  EEG obtained showing no seizure activity, encephalopathy.  General surgery, neurology evaluated.  He required transfer to the ICU on 3/12 was intubated for airway protection.  He was noted to have tongue swelling and received Decadron.  Noted to have neuro changes, eyes deviating upward and repeat CT of the brain no acute finding.  He also was started on Vanco p.o. for C. difficile colitis. Continued on Zosyn IV for suspected aspiration pneumonia.   Mental/Respiratory status continued to improve.  Patient self extubated on 3/15.  He passed his swallow eval.  He is tolerating p.o. intake, activity.  PT evaluated.  He completed phenobarbital.  Withdrawal resolved.  Psych liaison evaluated and patient agreeable with [facility] 28-day residential program after discharge.  Patient discharged home once cleared by all consultants.  He was instructed to follow-up with PCP in 1 week.    Lace+ Score: 26  59-90 High Risk  29-58 Medium Risk  0-28   Low Risk       TCM Follow-Up Recommendation:  LACE < 29: Low Risk of readmission after discharge from the hospital; Still recommend for TCM follow-up.      Procedures during hospitalization:   3/12 EEG  Impression:  This is a Abnormal EEG.  Moderate diffuse slowing into delta and theta range was noted.  This constellation of findings can be seen in encephalopathy due to metabolic/toxic etiology, medication effects or diffuse cerebral injury.  Intermittent beta activity was also noted, most likely due to medication effect.  No focal, lateralized or generalized epileptiform activity seen. Clinical correlation is recommended.      Consultants: General surgery, neurology, critical care         Discharge Medications        START taking these medications        Instructions Prescription details   folic acid 1 MG Tabs  Commonly known as: Folvite      Take 1 tablet (1 mg total) by mouth daily.   Quantity: 30 tablet  Refills: 0     thiamine 100 MG Tabs  Commonly known as: Vitamin  B1      Take 1 tablet (100 mg total) by mouth daily.   Quantity: 30 tablet  Refills: 0     vancomycin 125 MG Caps  Commonly known as: Vancocin  Notes to patient: Take at   9am, 1pm, 5pm, 9pm      Take 1 capsule (125 mg total) by mouth 4 (four) times daily for 8 days.   Stop taking on: March 27, 2024  Quantity: 32 capsule  Refills: 0               Where to Get Your Medications        These medications were sent to [facility] [address], [address] [phone], [phone]  [address], [address], [facility] [address]      Phone: [phone]   vancomycin 125 MG Caps       These medications were sent to [id number] - [place], [place] - [address] AT [address] [facility] [address], [phone], [phone] [address], [facility] [address]      Phone: [phone]   folic acid 1 MG Tabs  thiamine 100 MG Tabs         [place]    Follow-up appointment:   Tish Guzman  [address]  [facility] [address] [phone]    Schedule an appointment as soon as possible for a visit in 1 week(s)    -----------------------------------------------------------------------------------------------  PATIENT DISCHARGE INSTRUCTIONS: See electronic chart    HARSHAL Chapman  1:10 PM     The 21st Century Cures Act makes medical notes like these available to patients in the interest of transparency. Please be advised this is a medical document. Medical documents are intended to carry relevant information, facts as evident, and the clinical opinion of the practitioner. The medical note is intended as peer to peer communication and may appear blunt or direct. It is written in medical language and may contain abbreviations or verbiage that are unfamiliar.

## 2024-03-19 NOTE — PROGRESS NOTES
ICU  Critical Care APRN Progress Note    NAME: Philip Castellano - ROOM: 459/459-A - MRN: [id number] - Age: 38 year old - :1985    Subjective:  No acute events overnight. Patient wanting to go home prior to rehab.    OBJECTIVE  Vitals:  BP (!) 152/105 (BP Location: Right arm)   Pulse 106   Temp 99.5 °F (37.5 °C) (Temporal)   Resp 18   Ht 185.4 cm (6' 1\")   Wt 135 lb 12.9 oz (61.6 kg)   SpO2 99%   BMI 17.92 kg/m²              O2: RA    Physical Exam:    General Appearance:awake, calm,   HEENT: No JVD, tongue swollen and ecchymotic  Lungs: Clear to auscultation bilaterally, respirations unlabored  Heart: Regular rate and rhythm, S1 and S2 normal, no murmur, rub or gallop  Abdomen: Soft, non-tender, bowel sounds active all four quadrants, no masses, no organomegaly  Extremities: Extremities normal, atraumatic, no cyanosis or edema,capillary refill <3 sec.    Pulses: 2+ and symmetric all extremities  Skin: Skin color, texture, turgor normal for ethnicity, no rashes or lesions, warm and dry  Neurologic: awake, alert and oriented      Data this admission:  XR CHEST AP PORTABLE  (CPT=71045)    Result Date: 3/13/2024  CONCLUSION:  1. Placement of ET tube and NG tube when compared to 3/11/2024 examination. 2. Radiopaque body projects over the GE junction, correlate clinically. 3. Slight elevation of the right hemidiaphragm with development of right lower lobe retrocardiac opacity may represent atelectasis, infection or aspiration cannot be excluded, correlate clinically.  Clinical service notified of these findings with preliminary radiology report from [facility] services.    LOCATION:  [facility]      Dictated by (CST): Anya Marquez MD on 3/13/2024 at 7:38 AM     Finalized by (CST): Anya Marquez MD on 3/13/2024 at 7:41 AM       CT ANGIOGRAPHY, CHEST (CPT=71275)    Result Date: 3/13/2024  CONCLUSION:  1. No CT evidence for pulmonary embolism. 2. Right lower lobe partial atelectasis with endobronchial fluid/debris, consider  aspiration.  Recommend follow-up to ensure resolution and to exclude a perihilar mass.  Clinical service notified of these findings [facility]     [facility]:  [facility]   Dictated by (CST): Anya Marquez MD on 3/13/2024 at 7:10 AM     Finalized by (CST): Anya Marquez MD on 3/13/2024 at 7:16 AM       CT BRAIN OR HEAD (72711)    Result Date: 3/13/2024  CONCLUSION:  No acute intracranial hemorrhage.  Clinical service notified of these findings with preliminary radiology report from [facility] services.   LOCATION:  [facility]   Dictated by (CST): Anya Marquez MD on 3/13/2024 at 6:24 AM     Finalized by (CST): Anya Marquez MD on 3/13/2024 at 6:25 AM       XR CHEST AP PORTABLE  (CPT=71045)    Result Date: 3/11/2024  PROCEDURE:  XR CHEST AP PORTABLE  (CPT=71045)  TECHNIQUE:  AP chest radiograph was obtained.  COMPARISON:  None.  INDICATIONS:  mom heard thud, heavy ETOH use. Left hematoma, neck lac. Found in tub 238 level 2 trauma called. was coming around for EMS, seizing on arrival.  PATIENT STATED HISTORY: (As transcribed by Technologist)  Pt.   mom heard thud, heavy ETOH use. Left hematoma, neck lac. Found in tub 238 level 2 trauma called. was coming around for EMS, seizing on arrival.   FINDINGS: Cardiac silhouette and pulmonary vasculature are unremarkable. No consolidation, pleural effusion or pneumothorax.  Prominence of the right hilum is noted. IMPRESSION: No consolidation.  Prominence of the right hilum is noted.  A CT chest examination with contrast evaluate this region to exclude a mass is recommended.   LOCATION:  [facility]      Dictated by (CST): Agustin Rapp MD on 3/11/2024 at 5:53 PM     Finalized by (CST): Agustin Rapp MD on 3/11/2024 at 5:54 PM       CT ABDOMEN+PELVIS(CONTRAST ONLY)(CPT=74177)    Result Date: 3/11/2024  CONCLUSION:  1. Nonspecific fat stranding surrounding the ascending colon and cecum.  Findings may be related to nonspecific colitis. 2. Severe  diffuse hepatic steatosis.   LOCATION:  [id number]   Dictated by (CST): Sadiq Gordon MD on 3/11/2024 at 5:08 PM     Finalized by (CST): Sadiq Gordon MD on 3/11/2024 at 5:11 PM       CT BRAIN OR HEAD ([id number])    Result Date: 3/11/2024  CONCLUSION:   1. Negative for acute intracranial process.  2. Right frontal parietal scalp laceration and hematoma without calvarial fracture.    LOCATION:  [id number]   Dictated by (CST): Deandre Villalta MD on 3/11/2024 at 3:32 PM     Finalized by (CST): Deandre Villalta MD on 3/11/2024 at 3:34 PM           Labs:  Lab Results   Component Value Date    WBC 4.4 03/19/2024    HGB 10.5 03/19/2024    HCT 30.8 03/19/2024    .0 03/19/2024    CREATSERUM 0.45 03/19/2024    BUN 2 03/19/2024     03/19/2024    K 3.6 03/19/2024     03/19/2024    CO2 24.0 03/19/2024    GLU 93 03/19/2024    CA 8.8 03/19/2024    ALB 3.3 03/19/2024    ALKPHO 48 03/19/2024    BILT 0.5 03/19/2024    TP 6.3 03/19/2024    AST 32 03/19/2024    ALT 34 03/19/2024    MG 1.8 03/19/2024       Assessment/Plan:      ETOH withdrawal/Delirium Tremens--> resolving  - Ethyl alcohol 151 on admission 3/11  - CIWA protocol discontinued   - MVI/thiamine/folic acid  - Psych following    Acute resp failure- due to inability to protect airway when on heavy sedation as well as tongue laceration/trauma.  CTA chest also suggestive of RLL PNA c/w aspiration PNA  - Extubated 3/15, now on RA  - Decadron for lingual swelling complete  - Continue Zosyn (3/13-3/18)    Seizures - Likely 2/2 to etoh withdrawal  - Keppra x1 in ED  - EEG without evidence of seizures  - CT brain/spine negative for acute intracranial process  - Seizure precautions  - CK minimally elevated, not c/w rhabdo  - Neuro signed off- no further work up at this time      Scalp laceration/hematoma  - CT brain/spine unremarkable  - Trauma surgery evaluated, now signed off  - Monitor    NAGMA: Likely 2/2 diarrhea  -monitor    Hyponatremia  -Monitor Na daily  -encourage PO      Cdiff  - Cdiff positive, EIA negative  - CT abd with possible colitis  - PO vanc  -Contact plus isolation     Thrombocytopenia: Likely 2/2 to etoh use  - Plts improving  - No s/s of bleeding  - Monitor daily CBC    Abnormal CT chest- due to asp PNA, mucous plugging atelectasis  - may benefit from repeat CT scan as OP to ensure resolution    F/E/N  - Replete electrolytes per protocol  - regular diet     Proph   -SCDs  - Lovenox     Dispo - Full code  - may transfer to floor vs discharge     Plan of care discussed with intensivist Dr. Trever Bhatti, [facility]  Critical Care NP  Phone [phone]    Intensivist Addendum:  I agree with APN note above. I have independently seen & evaluated the patient.  I agree with the management plan outlined above with the following changes/additions:    Patient stable, awaiting transfer to floor.  He has refused discharge to [facility] re[facility] and would like to go home first.  Given this - plan for discharge from ICU today to home. Can revisit rehab admission discussions as outpatient    L3

## 2024-03-19 NOTE — PLAN OF CARE
Received patient following report. Patient alert and oriented. Cooperative. ST on tele. Hemodynamically stable. Afebrile. Denies pain. Room air. IVF infusing. Voiding via urinal/bathroom. Up to bathroom with 1 assist. Transfer orders remain in place. See flowsheets and MAR.

## 2024-03-19 NOTE — DIETARY NOTE
[facility]   part of [facility]  NUTRITION ASSESSMENT    Unable to diagnose malnutrition criteria at this time.    NUTRITION INTERVENTION:    Meal and Snacks - Continue Regular Diet as tolerated.    PATIENT STATUS: 3/19: Pt self-extubated 3/15 and NGT removed 3/16. Pt with good appetite/PO intake. No N/V but having diarrhea d/t +cdiff. Plan for possible discharge today.    3/15: Pt remains intubated, sedated on propofol, tolerating TF at goal via NGT. Pt with tongue swelling (likely from biting it during seizure) and started on steroids. Possible SBT and extubation today. Will continue to monitor and follow up as appropriate.  3/13: 38 year old male admitted on 3/11 presents s/p fall from alcohol intoxication. RRT called yesterday d/t increased agitation and high CIWA; transferred to ICU and intubated overnight for airway protection. Pt screened d/t consult for TF recs. Pt is currently intubated, sedated on propofol, NPO with NGT to LIS. No plan to extubate today. Noted K+, Mg and Phos all low - concern for refeeding syndrome. Folic acid and thiamine IV already ordered and being given. Will provide TF recs above and advance slowly. Recommend aggressive electrolytes replacement protocol.    PMH:  has a past medical history of Alcoholism (HCC) and Clavicle fracture.    ANTHROPOMETRICS:  Ht:  6'1\"  Wt: 61.6 kg (135 lb 12.9 oz).   BMI: Body mass index is 17.92 kg/m².  IBW: 83.6 kg    WEIGHT HISTORY:   Patient Weight(s) for the past 336 hrs:   Weight   03/18/24 0500 61.6 kg (135 lb 12.9 oz)   03/16/24 2025 61.4 kg (135 lb 5.8 oz)   03/15/24 2100 65.8 kg (145 lb 1 oz)   03/15/24 0400 63.7 kg (140 lb 6.9 oz)   03/14/24 0500 62.1 kg (136 lb 14.5 oz)   03/13/24 0545 59.1 kg (130 lb 4.7 oz)   03/12/24 2140 57.9 kg (127 lb 10.3 oz)   03/11/24 2214 72.6 kg (160 lb)       Wt Readings from Last 10 Encounters:   03/18/24 61.6 kg (135 lb 12.9 oz)   03/16/24 65.8 kg (145 lb 1 oz)   05/12/20 72.6 kg (160 lb)   01/16/19 68  kg (150 lb)   01/15/19 67.6 kg (149 lb 0.5 oz)   09/12/18 67.6 kg (149 lb)   06/27/18 70.8 kg (156 lb)        NUTRITION:  Diet:       Procedures    Regular/General diet Is Patient on Accuchecks? No        Percent Meals Eaten (last 3 days)       Date/Time Percent Meals Eaten (%)    03/18/24 0900 100 %    03/18/24 1244 0 %     Percent Meals Eaten (%): slept through lunch at 03/18/24 1244    03/18/24 1700 100 %     Percent Meals Eaten (%): ate both lunch and dinner meals at 03/18/24 1700            Food Allergies: No  Cultural/Ethnic/Zoroastrian Preferences Addressed: Yes    GI SYSTEM REVIEW: diarrhea (+cdiff); last BM 3/18  Skin/Wounds: WNL    NUTRITION RELATED PHYSICAL FINDINGS:     1. Body Fat/Muscle Mass:  YAHAIRA     2. Fluid Accumulation: none per RN documentation     NUTRITION PRESCRIPTION: 61.6 kg (3/18)  Calories: 7656-3483 calories/day (30-35 kcal/kg)  Protein: 74-92 grams protein/day (1.2-1.5 gm/kg)  Fluid: ~1 ml/kcal or per MD discretion    NUTRITION DIAGNOSIS/PROBLEM:  Inadequate oral intake related to alcohol/substance abuse as evidenced by documented/reported insufficient oral intake.    MONITOR AND EVALUATE/NUTRITION GOALS:  PO intake of 75% of meals TID - New  PO intake of 75% of oral nutrition supplement/s - New  Weight stable within 1 to 2 lbs during admission - Ongoing  Tolerate alternative nutrition at 100% of goal - Resolved      MEDICATIONS:  Folic acid 1 mg, Kcl 20 mEq daily, Kphos 500 mg QID, simethicone, thiamine 100 mg, abx  Gtt: NS at 83 ml/hr    LABS:  K+ 3.6    Pt is at Low nutrition risk    Janel Loco RD, LDN, CNSC  Clinical Dietitian  Spectra: [phone]

## 2024-03-19 NOTE — PHYSICAL THERAPY NOTE
IP PT attempted, pt reports he is getting picked up in 20 min to d/c home.  Pt denies  concerns for safe return home and states he is mobilizing close to baseline.

## 2024-03-20 NOTE — PAYOR COMM NOTE
--------------  DISCHARGE REVIEW    Payor: [employer]OLAND  Subscriber #:  [id number]  Authorization Number: [id number]    Admit date: 3/11/24  Admit time:   6:21 PM  Discharge Date: 3/19/2024  2:25 PM     Admitting Physician: Sera Harris MD  Attending Physician:  No att. providers found  Primary Care Physician: Tish Guzman          Discharge Summary Notes        Discharge Summary signed by Tomasa Sparrow PA at 3/19/2024  1:13 PM       Author: Tomasa Sparrow PA Specialty: Internal Medicine Author Type: Physician Assistant    Filed: 3/19/2024  1:13 PM Date of Service: 3/19/2024 11:28 AM Status: Attested    : Tomasa Sparrow PA (Physician Assistant) Cosigner: Guero Sepulveda DO at 3/19/2024  2:56 PM           [facility]     Philip Castellano Patient Status:  Inpatient    1985 MRN [id number]   Location [facility] 4SW-A Attending Guero Sepulveda DO   Hosp Day # 8 PCP Tish Guzman     Date of Admission: 3/11/2024  Date of Discharge:   3/19/2024     Discharge Disposition: Home     Discharge Diagnosis:  #Acute hypoxemic resp failure, extubated 3/15  #Aspiration pneumonia, resolved  #C. difficile colitis, improved  #Alcohol withdrawals, Delirium tremens, resolved  #NAGMA, resolved  #Hyponatremia, resolved  #Seizures suspect ETOH withdrawal seizures  #Tongue laceration, inflammation in oral cavity, resolved   #Double vision, resolved  #Elevated AST/ALT due to ETOH abuse, resolved  #Facial/scalp/nose laceration and Partial hematoma    History of Present Illness: Philip Castellano is a 38 year old male with medical history of alcohol abuse who comes to the ER for evaluation after a fall at home.  His mother called paramedics as she heard 2 loud falls.  He was found in the tub by EMS with a large hematoma on the right side of his head and a laceration to his nose.  He was brought to the ER and on arrival to the ER he started having a seizure which lasted approximately 15 seconds.   He states that his last drink was 2 nights ago.  He does report that he does have a drinking problem and binge drinks frequently.  Today he reports he did have some marijuana.  Currently he is awake alert.  He reports he bit his tongue and is noted to have trauma to his nose and blood around his lips.     Brief Synopsis: Patient is a 38-year-old male who presented after fall at home.  He was brought into the ER for further workup and was noted to have seizure.  He has a history of EtOH abuse.   Patient had a scalp laceration.  CT of the brain no bleeding.  No fracture seen on CT C-spine.  CT abdomen pelvis showing nonspecific colitis.  He received Keppra.  He was admitted for further workup and CIWA protocol was initiated.  EEG obtained showing no seizure activity, encephalopathy.  General surgery, neurology evaluated.  He required transfer to the ICU on 3/12 was intubated for airway protection.  He was noted to have tongue swelling and received Decadron.  Noted to have neuro changes, eyes deviating upward and repeat CT of the brain no acute finding.  He also was started on Vanco p.o. for C. difficile colitis. Continued on Zosyn IV for suspected aspiration pneumonia.   Mental/Respiratory status continued to improve.  Patient self extubated on 3/15.  He passed his swallow eval.  He is tolerating p.o. intake, activity.  PT evaluated.  He completed phenobarbital.  Withdrawal resolved.  Psych liaison evaluated and patient agreeable with [facility] 28-day residential program after discharge.  Patient discharged home once cleared by all consultants.  He was instructed to follow-up with PCP in 1 week.    Lace+ Score: 26  59-90 High Risk  29-58 Medium Risk  0-28   Low Risk       TCM Follow-Up Recommendation:  LACE < 29: Low Risk of readmission after discharge from the hospital; Still recommend for TCM follow-up.      Procedures during hospitalization:   3/12 EEG  Impression:  This is a Abnormal EEG.  Moderate diffuse slowing  into delta and theta range was noted.  This constellation of findings can be seen in encephalopathy due to metabolic/toxic etiology, medication effects or diffuse cerebral injury.  Intermittent beta activity was also noted, most likely due to medication effect.  No focal, lateralized or generalized epileptiform activity seen. Clinical correlation is recommended.      Consultants: General surgery, neurology, critical care         Discharge Medications        START taking these medications        Instructions Prescription details   folic acid 1 MG Tabs  Commonly known as: Folvite      Take 1 tablet (1 mg total) by mouth daily.   Quantity: 30 tablet  Refills: 0     thiamine 100 MG Tabs  Commonly known as: Vitamin B1      Take 1 tablet (100 mg total) by mouth daily.   Quantity: 30 tablet  Refills: 0     vancomycin 125 MG Caps  Commonly known as: Vancocin  Notes to patient: Take at   9am, 1pm, 5pm, 9pm      Take 1 capsule (125 mg total) by mouth 4 (four) times daily for 8 days.   Stop taking on: March 27, 2024  Quantity: 32 capsule  Refills: 0               Where to Get Your Medications        These medications were sent to [facility] - [facility] [address], [place] [address] [phone], [phone]  [address] [place] [address], [place] [address], [facility] [address]      Phone: [phone]   vancomycin 125 MG Caps       These medications were sent to [id number] - [place], [place] - [address] AT [facility] [address], [phone], [phone]  [address], [facility] [address]      Phone: [phone]   folic acid 1 MG Tabs  thiamine 100 MG Tabs         ILPM reviewed: yes    Follow-up appointment:   Tish Guzman  [address]  [facility] [address] [phone]    Schedule an appointment as soon as possible for a visit in 1 week(s)    -----------------------------------------------------------------------------------------------  PATIENT DISCHARGE INSTRUCTIONS: See electronic  chart    HARSHAL Chapman  1:10 PM     The 21st Century Cures Act makes medical notes like these available to patients in the interest of transparency. Please be advised this is a medical document. Medical documents are intended to carry relevant information, facts as evident, and the clinical opinion of the practitioner. The medical note is intended as peer to peer communication and may appear blunt or direct. It is written in medical language and may contain abbreviations or verbiage that are unfamiliar.        Attestation signed by Guero Sepulveda DO at 3/19/2024  2:56 PM    Addendum:    Agree with above note.  Pt. Seen and examined.    Gen: NAD  CVS: s1s2  Resp: CTA  Abd: soft    38 minutes spent on entire discharge process.  Wonderful outlined by Tomasa CAPUTO of patient's hospitalization.  Patient encouraged family on importance of alcohol cessation and to complete p.o. vancomycin.      Pt seen an examined independently. Chart reviewed.   Labs and imaging over the last 24 hours have been personally reviewed.  I personally made/approved 100% of the management plan for this patient and take full responsibility for the patient management.   Note has been reviewed by me and modified as needed.  Exam and Impression/ Recs as noted above.  D/w staff and with patient.      Guero Sepulveda DO                  Electronically signed by Guero Sepulveda DO on 3/19/2024  2:56 PM         REVIEWER COMMENTS

## 2024-10-31 ENCOUNTER — APPOINTMENT (OUTPATIENT)
Dept: CT IMAGING | Facility: HOSPITAL | Age: 39
End: 2024-10-31
Attending: EMERGENCY MEDICINE
Payer: MEDICAID

## 2024-10-31 PROCEDURE — 70450 CT HEAD/BRAIN W/O DYE: CPT | Performed by: EMERGENCY MEDICINE

## 2024-11-25 ENCOUNTER — HOSPITAL ENCOUNTER (EMERGENCY)
Age: 39
Discharge: HOME | End: 2024-11-26
Attending: STUDENT IN AN ORGANIZED HEALTH CARE EDUCATION/TRAINING PROGRAM

## 2024-11-25 DIAGNOSIS — R11.2 NAUSEA AND VOMITING, UNSPECIFIED VOMITING TYPE: Primary | ICD-10-CM

## 2024-11-25 PROCEDURE — 99285 EMERGENCY DEPT VISIT HI MDM: CPT

## 2024-11-25 PROCEDURE — 36415 COLL VENOUS BLD VENIPUNCTURE: CPT

## 2024-11-25 PROCEDURE — 93005 ELECTROCARDIOGRAM TRACING: CPT | Performed by: STUDENT IN AN ORGANIZED HEALTH CARE EDUCATION/TRAINING PROGRAM

## 2024-11-25 RX ORDER — ONDANSETRON 2 MG/ML
4 INJECTION INTRAMUSCULAR; INTRAVENOUS ONCE
Status: COMPLETED | OUTPATIENT
Start: 2024-11-25 | End: 2024-11-26

## 2024-11-25 RX ORDER — FAMOTIDINE 10 MG/ML
20 INJECTION, SOLUTION INTRAVENOUS ONCE
Status: COMPLETED | OUTPATIENT
Start: 2024-11-25 | End: 2024-11-26

## 2024-11-25 ASSESSMENT — PAIN SCALES - GENERAL: PAINLEVEL_OUTOF10: 0

## 2024-11-26 ENCOUNTER — APPOINTMENT (OUTPATIENT)
Dept: CT IMAGING | Age: 39
End: 2024-11-26
Attending: STUDENT IN AN ORGANIZED HEALTH CARE EDUCATION/TRAINING PROGRAM

## 2024-11-26 ENCOUNTER — APPOINTMENT (OUTPATIENT)
Dept: GENERAL RADIOLOGY | Age: 39
End: 2024-11-26
Attending: STUDENT IN AN ORGANIZED HEALTH CARE EDUCATION/TRAINING PROGRAM

## 2024-11-26 VITALS
WEIGHT: 132.06 LBS | HEIGHT: 73 IN | BODY MASS INDEX: 17.5 KG/M2 | SYSTOLIC BLOOD PRESSURE: 111 MMHG | RESPIRATION RATE: 12 BRPM | DIASTOLIC BLOOD PRESSURE: 80 MMHG | HEART RATE: 95 BPM | TEMPERATURE: 97.5 F | OXYGEN SATURATION: 99 %

## 2024-11-26 LAB
ALBUMIN SERPL-MCNC: 3.6 G/DL (ref 3.4–5)
ALBUMIN/GLOB SERPL: 1.1 {RATIO} (ref 1–2.4)
ALP SERPL-CCNC: 51 UNITS/L (ref 45–117)
ALT SERPL-CCNC: 116 UNITS/L
AMPHETAMINES UR QL SCN>500 NG/ML: NEGATIVE
ANION GAP SERPL CALC-SCNC: 8 MMOL/L (ref 7–19)
APPEARANCE UR: CLEAR
AST SERPL-CCNC: 140 UNITS/L
BACTERIA #/AREA URNS HPF: ABNORMAL /HPF
BARBITURATES UR QL SCN>200 NG/ML: NEGATIVE
BASOPHILS # BLD: 0 K/MCL (ref 0–0.3)
BASOPHILS NFR BLD: 1 %
BENZODIAZ UR QL SCN>200 NG/ML: POSITIVE
BILIRUB SERPL-MCNC: 0.9 MG/DL (ref 0.2–1)
BILIRUB UR QL STRIP: NEGATIVE
BUN SERPL-MCNC: 11 MG/DL (ref 6–20)
BUN/CREAT SERPL: 16 (ref 7–25)
BZE UR QL SCN>150 NG/ML: NEGATIVE
CALCIUM SERPL-MCNC: 9.2 MG/DL (ref 8.4–10.2)
CANNABINOIDS UR QL SCN>50 NG/ML: POSITIVE
CHLORIDE SERPL-SCNC: 93 MMOL/L (ref 97–110)
CO2 SERPL-SCNC: 33 MMOL/L (ref 21–32)
COLOR UR: YELLOW
CREAT SERPL-MCNC: 0.7 MG/DL (ref 0.67–1.17)
DEPRECATED RDW RBC: 45.2 FL (ref 39–50)
EGFRCR SERPLBLD CKD-EPI 2021: >90 ML/MIN/{1.73_M2}
EOSINOPHIL # BLD: 0 K/MCL (ref 0–0.5)
EOSINOPHIL NFR BLD: 1 %
ERYTHROCYTE [DISTWIDTH] IN BLOOD: 14 % (ref 11–15)
ETHANOL SERPL-MCNC: NORMAL MG/DL
FASTING DURATION TIME PATIENT: ABNORMAL H
FENTANYL UR QL SCN: NEGATIVE
FLUAV RNA RESP QL NAA+PROBE: NOT DETECTED
FLUBV RNA RESP QL NAA+PROBE: NOT DETECTED
GLOBULIN SER-MCNC: 3.2 G/DL (ref 2–4)
GLUCOSE SERPL-MCNC: 104 MG/DL (ref 70–99)
GLUCOSE UR STRIP-MCNC: NEGATIVE MG/DL
HCT VFR BLD CALC: 40.5 % (ref 39–51)
HGB BLD-MCNC: 14.4 G/DL (ref 13–17)
HGB UR QL STRIP: NEGATIVE
HYALINE CASTS #/AREA URNS LPF: ABNORMAL /LPF
IMM GRANULOCYTES # BLD AUTO: 0 K/MCL (ref 0–0.2)
IMM GRANULOCYTES # BLD: 0 %
KETONES UR STRIP-MCNC: NEGATIVE MG/DL
LACTATE BLDV-SCNC: 1.5 MMOL/L (ref 0–2)
LEUKOCYTE ESTERASE UR QL STRIP: NEGATIVE
LIPASE SERPL-CCNC: 83 UNITS/L (ref 15–77)
LYMPHOCYTES # BLD: 1.3 K/MCL (ref 1–4.8)
LYMPHOCYTES NFR BLD: 20 %
MCH RBC QN AUTO: 32 PG (ref 26–34)
MCHC RBC AUTO-ENTMCNC: 35.6 G/DL (ref 32–36.5)
MCV RBC AUTO: 90 FL (ref 78–100)
MONOCYTES # BLD: 0.7 K/MCL (ref 0.3–0.9)
MONOCYTES NFR BLD: 10 %
MUCOUS THREADS URNS QL MICRO: PRESENT
NEUTROPHILS # BLD: 4.5 K/MCL (ref 1.8–7.7)
NEUTROPHILS NFR BLD: 68 %
NITRITE UR QL STRIP: NEGATIVE
NRBC BLD MANUAL-RTO: 0 /100 WBC
OPIATES UR QL SCN>300 NG/ML: NEGATIVE
PCP UR QL SCN>25 NG/ML: NEGATIVE
PH UR STRIP: 8 [PH] (ref 5–7)
PLATELET # BLD AUTO: 156 K/MCL (ref 140–450)
POTASSIUM SERPL-SCNC: 3.6 MMOL/L (ref 3.4–5.1)
PR-INTERVAL (MSEC): 92
PROT SERPL-MCNC: 6.8 G/DL (ref 6.4–8.2)
PROT UR STRIP-MCNC: 30 MG/DL
QRS-INTERVAL (MSEC): 100
QT-INTERVAL (MSEC): 428
QTC: 481
R AXIS (DEGREES): 88
RAINBOW EXTRA TUBES HOLD SPECIMEN: NORMAL
RBC # BLD: 4.5 MIL/MCL (ref 4.5–5.9)
RBC #/AREA URNS HPF: ABNORMAL /HPF
REPORT TEXT: NORMAL
RSV AG NPH QL IA.RAPID: NOT DETECTED
SARS-COV-2 RNA RESP QL NAA+PROBE: NOT DETECTED
SERVICE CMNT-IMP: NORMAL
SERVICE CMNT-IMP: NORMAL
SODIUM SERPL-SCNC: 130 MMOL/L (ref 135–145)
SP GR UR STRIP: >1.03 (ref 1–1.03)
SQUAMOUS #/AREA URNS HPF: ABNORMAL /HPF
T AXIS (DEGREES): 77
TROPONIN I SERPL DL<=0.01 NG/ML-MCNC: <4 NG/L
UROBILINOGEN UR STRIP-MCNC: 2 MG/DL
VENTRICULAR RATE EKG/MIN (BPM): 76
WBC # BLD: 6.6 K/MCL (ref 4.2–11)
WBC #/AREA URNS HPF: ABNORMAL /HPF

## 2024-11-26 PROCEDURE — 87040 BLOOD CULTURE FOR BACTERIA: CPT | Performed by: STUDENT IN AN ORGANIZED HEALTH CARE EDUCATION/TRAINING PROGRAM

## 2024-11-26 PROCEDURE — 10002800 HB RX 250 W HCPCS: Performed by: STUDENT IN AN ORGANIZED HEALTH CARE EDUCATION/TRAINING PROGRAM

## 2024-11-26 PROCEDURE — 84484 ASSAY OF TROPONIN QUANT: CPT | Performed by: STUDENT IN AN ORGANIZED HEALTH CARE EDUCATION/TRAINING PROGRAM

## 2024-11-26 PROCEDURE — 83690 ASSAY OF LIPASE: CPT | Performed by: STUDENT IN AN ORGANIZED HEALTH CARE EDUCATION/TRAINING PROGRAM

## 2024-11-26 PROCEDURE — 82077 ASSAY SPEC XCP UR&BREATH IA: CPT | Performed by: STUDENT IN AN ORGANIZED HEALTH CARE EDUCATION/TRAINING PROGRAM

## 2024-11-26 PROCEDURE — 96374 THER/PROPH/DIAG INJ IV PUSH: CPT

## 2024-11-26 PROCEDURE — 96375 TX/PRO/DX INJ NEW DRUG ADDON: CPT

## 2024-11-26 PROCEDURE — 85025 COMPLETE CBC W/AUTO DIFF WBC: CPT | Performed by: STUDENT IN AN ORGANIZED HEALTH CARE EDUCATION/TRAINING PROGRAM

## 2024-11-26 PROCEDURE — 10002801 HB RX 250 W/O HCPCS: Performed by: STUDENT IN AN ORGANIZED HEALTH CARE EDUCATION/TRAINING PROGRAM

## 2024-11-26 PROCEDURE — 10002805 HB CONTRAST AGENT: Performed by: STUDENT IN AN ORGANIZED HEALTH CARE EDUCATION/TRAINING PROGRAM

## 2024-11-26 PROCEDURE — 83605 ASSAY OF LACTIC ACID: CPT | Performed by: STUDENT IN AN ORGANIZED HEALTH CARE EDUCATION/TRAINING PROGRAM

## 2024-11-26 PROCEDURE — 71045 X-RAY EXAM CHEST 1 VIEW: CPT

## 2024-11-26 PROCEDURE — 0241U COVID/FLU/RSV PANEL: CPT | Performed by: STUDENT IN AN ORGANIZED HEALTH CARE EDUCATION/TRAINING PROGRAM

## 2024-11-26 PROCEDURE — 80307 DRUG TEST PRSMV CHEM ANLYZR: CPT | Performed by: STUDENT IN AN ORGANIZED HEALTH CARE EDUCATION/TRAINING PROGRAM

## 2024-11-26 PROCEDURE — 74177 CT ABD & PELVIS W/CONTRAST: CPT

## 2024-11-26 PROCEDURE — 93005 ELECTROCARDIOGRAM TRACING: CPT

## 2024-11-26 PROCEDURE — 81001 URINALYSIS AUTO W/SCOPE: CPT | Performed by: STUDENT IN AN ORGANIZED HEALTH CARE EDUCATION/TRAINING PROGRAM

## 2024-11-26 PROCEDURE — 96361 HYDRATE IV INFUSION ADD-ON: CPT

## 2024-11-26 PROCEDURE — 10002807 HB RX 258: Performed by: STUDENT IN AN ORGANIZED HEALTH CARE EDUCATION/TRAINING PROGRAM

## 2024-11-26 PROCEDURE — 80053 COMPREHEN METABOLIC PANEL: CPT | Performed by: STUDENT IN AN ORGANIZED HEALTH CARE EDUCATION/TRAINING PROGRAM

## 2024-11-26 RX ORDER — ONDANSETRON 4 MG/1
4 TABLET, ORALLY DISINTEGRATING ORAL EVERY 6 HOURS PRN
Qty: 10 TABLET | Refills: 0 | Status: SHIPPED | OUTPATIENT
Start: 2024-11-26

## 2024-11-26 RX ADMIN — FAMOTIDINE 20 MG: 10 INJECTION INTRAVENOUS at 01:02

## 2024-11-26 RX ADMIN — ONDANSETRON 4 MG: 2 INJECTION INTRAMUSCULAR; INTRAVENOUS at 01:01

## 2024-11-26 RX ADMIN — IOHEXOL 70 ML: 350 INJECTION, SOLUTION INTRAVENOUS at 02:25

## 2024-11-26 RX ADMIN — SODIUM CHLORIDE, POTASSIUM CHLORIDE, SODIUM LACTATE AND CALCIUM CHLORIDE 1000 ML: 600; 310; 30; 20 INJECTION, SOLUTION INTRAVENOUS at 01:01

## 2024-11-30 LAB
BACTERIA BLD CULT: NORMAL
BACTERIA BLD CULT: NORMAL

## 2024-12-01 LAB
BACTERIA BLD CULT: NORMAL
BACTERIA BLD CULT: NORMAL

## (undated) NOTE — ED AVS SNAPSHOT
Jason Chino   MRN: [id number]    Department:  [facility] Emergency Department   Date of Visit:  1/16/2019           Disclosure     Insurance plans vary and the physician(s) referred by the ER may not be covered by your plan.  Please contact your tell this physician (or your personal doctor if your instructions are to return to your personal doctor) about any new or lasting problems. The primary care or specialist physician will see patients referred from the [facility] Emergency Department.  Geo Graham

## (undated) NOTE — ED AVS SNAPSHOT
Diane Dixon   MRN: [id number]    Department:  [facility] Emergency Department   Date of Visit:  1/15/2019           Disclosure     Insurance plans vary and the physician(s) referred by the ER may not be covered by your plan.  Please contact your tell this physician (or your personal doctor if your instructions are to return to your personal doctor) about any new or lasting problems. The primary care or specialist physician will see patients referred from the [facility] Emergency Department.  Maine Graham